# Patient Record
Sex: MALE | Race: WHITE | URBAN - METROPOLITAN AREA
[De-identification: names, ages, dates, MRNs, and addresses within clinical notes are randomized per-mention and may not be internally consistent; named-entity substitution may affect disease eponyms.]

---

## 2017-02-10 ENCOUNTER — OFFICE VISIT (OUTPATIENT)
Dept: PODIATRY | Facility: CLINIC | Age: 77
End: 2017-02-10
Payer: MEDICARE

## 2017-02-10 VITALS — HEIGHT: 67 IN | HEART RATE: 72 BPM | BODY MASS INDEX: 27 KG/M2 | WEIGHT: 172 LBS

## 2017-02-10 DIAGNOSIS — M77.8 CAPSULITIS OF FOOT, LEFT: Primary | ICD-10-CM

## 2017-02-10 DIAGNOSIS — L84 CORNS AND CALLOSITIES: ICD-10-CM

## 2017-02-10 PROCEDURE — 99213 OFFICE O/P EST LOW 20 MIN: CPT | Mod: 25 | Performed by: PODIATRIST

## 2017-02-10 PROCEDURE — 11056 PARNG/CUTG B9 HYPRKR LES 2-4: CPT | Mod: GA | Performed by: PODIATRIST

## 2017-02-10 NOTE — PATIENT INSTRUCTIONS
We wish you continued good healing. If you have any questions or concerns, please do not hesitate to contact us at [phone].      Please remember to call and schedule a follow up appointment if one was recommended at your earliest convenience.   PODIATRY CLINIC HOURS  TELEPHONE NUMBER    Dr. Gustavo Martínez D.P.M [facility]    Clinics:  [facility]        Mireya Martin MA  Medical Assistant  Tuesday 1PM-6PM  [place]  Wednesday 7AM-2PM  [place]  Thursday 10AM-6PM  [place] Friday 7AM-345PM  [place]  Specialty schedulers:   [phone] to make an appointment with any Specialty Provider.        Urgent Care locations:    [facility] Monday-Friday 5 pm - 9 pm. Saturday-Sunday 9 am -5pm    Monday-Friday 11 am - 9 pm Saturday 9 am - 5 pm     Monday-Sunday 12 noon-8PM [phone]     If you need a medication refill, please contact us you may need lab work and/or a follow up visit prior to your refill (i.e. Antifungal medications).    Layar[employer] (secure e-mail communication and access to your chart) to send a message or to make an appointment.    If MRI needed please call [facility] at [phone]        Weight management plan: Patient was referred to their PCP to discuss a diet and exercise plan.     Smoking Cessation    What's in cigarette smoke? - Cigarette smoke contains over 4,000 chemicals. Nicotine is one of the main ingredients which is an insecticide/herbicide. It is poisonous to our nervous system, increases blood clotting risk, and decreases the body's defenses to fight off infection. Another chemical is Carbon Monoxide is an asphyxiating gas that permanently binds to blood cells and blocks the transport of oxygen. This leads to tissue death and decreases your metabolism. Tar is a chemical that coats your lungs and trachea which impairs new oxygen coming in  and carbon dioxide getting out of your body.   How does smoking impact surgery? - Smoking is particularly hazardous with regards to surgery. Surgery puts stress on the body and a smoker's body is already under strain from these chemicals. Putting the two together, especially for an elective surgery, could be a recipe for disaster. Smoking before and after surgery increases your risk of heart problems, slow wound healing, delayed bone healing, blood clots, wound infection and anesthesia complications.   What are the benefits of quitting? - In 20 minutes your blood pressure will drop back down to normal. In 8 hours the carbon monoxide (a toxic gas) levels in your blood stream will drop by half, and oxygen levels will return to normal. In 48 hours your chance of having a heart attack will have decreased. All nicotine will have left your body. Your sense of taste and smell will return to a normal level. In 72 hours your bronchial tubes will relax, and your energy levels will increase. In 2 weeks your circulation will increase, and it will continue to improve for the next 10 weeks.    Recommendations for elective surgery - Ideally, patients should quit smoking 8 weeks before and at least 2 weeks after elective surgery in order to avoid complications. Simply cutting back on the amount of cigarettes smoked per day does not offer any benefit or decrease the risk of poor wound healing, heart problems, and infection. Smokers should also start taking Vitamin C and B for two weeks before surgery and two weeks after surgery.    Ways to Stop Smokin. Nicotine patches, lozenges, or gum  2. Support Groups  3. Medications (see below)    List of Medications:  1. Varenicline Tartrate (CHANTIX)   2. Bupropion HCL (WELLBUTRIN, ZYBAN)   note: make sure Wellbutrin is for smoking cessation and not other issues   3. Nicotine Patch (NICODERM)   4. Nicotine Inhaler (NICOTROL)   5. Nicotine Gum Nicotine Polacrilex   6. Nicotine Lozenge:  Nicotine Polacrilex (COMMIT)   * [place] [facility] offers a smoking support group as well!  Please visit: [email or web address]  If you are interested in these, ask about getting a prescription or talk to your primary care doctor about what may be the best way for you to quit.

## 2017-02-10 NOTE — NURSING NOTE
"Chief Complaint   Patient presents with     Foot Problems     trim callus       Initial Pulse 72  Ht 1.702 m (5' 7\")  Wt 78.019 kg (172 lb)  BMI 26.93 kg/m2 Estimated body mass index is 26.93 kg/(m^2) as calculated from the following:    Height as of this encounter: 1.702 m (5' 7\").    Weight as of this encounter: 78.019 kg (172 lb).  Medication Reconciliation: complete  "

## 2017-02-10 NOTE — PROGRESS NOTES
S:  Patient complains of left foot pain.  Points to plantar second metatarsal head.  Describes as a burning pain.  aggravated by activity and relieved by rest.  Has had this for 1 months.  No pain anywhere else on feet.  Current shoes are old.  Would also like calluses trimmed and signed ABN before seeing him.  Patient  has PAD.      ROS:  Denies edema, erythema, ecchymosis, numbness, or drainage.  Denies weakness or toe deformity.       Allergies   Allergen Reactions     Ace Inhibitors      COUGH     Bacitracin      Ceftin      Crestor [Hmg-Coa-R Inhibitors]      Septra [Bactrim]        Current Outpatient Prescriptions   Medication Sig Dispense Refill     predniSONE (DELTASONE) 2.5 MG tablet Take 2 tablets (5 mg) by mouth daily 180 tablet 0     hydrochlorothiazide (HYDRODIURIL) 25 MG tablet Take 1 tablet (25 mg) by mouth daily 90 tablet 2     potassium chloride SA (POTASSIUM CHLORIDE) 20 MEQ tablet Take 1 tablet (20 mEq) by mouth daily 90 tablet 1     amLODIPine (NORVASC) 10 MG tablet Take 1 tablet (10 mg) by mouth daily 90 tablet 1     atenolol (TENORMIN) 25 MG tablet 3 po daily 270 tablet 2     simvastatin (ZOCOR) 40 MG tablet Take 1 tablet (40 mg) by mouth At Bedtime 90 tablet 3     Cholecalciferol (VITAMIN D) 1000 UNITS capsule Take 2 capsules by mouth daily.       aspirin 81 MG tablet Take 1 tablet by mouth daily.         Patient Active Problem List   Diagnosis     Leukocytoclastic vasculitis (H)     Peripheral vascular disease (H)     HYPERLIPIDEMIA LDL GOAL <100     Tobacco abuse     Palindromic rheumatism     Advanced directives, counseling/discussion     Hypertension goal BP (blood pressure) < 140/90     Cataract     Carotid stenosis     Neurogenic bladder     Hypertension     Peripheral arterial occlusive disease (H)       Past Medical History   Diagnosis Date     Carotid stenosis      left     Urinary retention      self cath     Peripheral vascular disease (H)      VENTRAL HERNIA      Hyperlipemia       "History of depression      Tobacco dependence      Hemorrhoids      Condyloma acuminata      Dermatitis      metal-contact     Malignant melanoma nos 2-07     HTN        Past Surgical History   Procedure Laterality Date     Hc exc malig skin lesion trunk/arm/leg 1.1-2.0 cm  2/07     right forearm melanoma     Pvd stenting  summer 2010     4 stents total, 2 different procedures     Kidney surgery       r renal artery stent       Family History   Problem Relation Age of Onset     Cardiovascular Father      Cardiovascular Maternal Uncle      DIABETES Mother      Breast Cancer Mother      Hypertension Mother      Hypertension Sister      Unknown/Adopted Daughter      Glaucoma No family hx of      Macular Degeneration No family hx of        Social History   Substance Use Topics     Smoking status: Current Every Day Smoker -- 1.00 packs/day for 50 years     Types: Cigarettes     Smokeless tobacco: Never Used     Alcohol Use: No         O:   Pulse 72  Ht 1.702 m (5' 7\")  Wt 78.019 kg (172 lb)  BMI 26.93 kg/m2.  Patient good historian.  A&O X3.  Pulses DP, PT 0/4 b/l.  CRT < 3 seconds X 10 digits.  ankle edema or varicosities noted.  Sensation to light touch intact b/l.  Reflexes 2/4 b/l.  Skin thin scant hairb/l.  Sub left 1/2/5 calluses, but 2 very slight.  Normal arch with weightbearing.  HT deformities noted.  MS 5/5 all compartments.  Normal ROM all fore foot and rearfoot joints.  No equinus.  Pain under left second metatarsal head plantar.  Negative Lachmans test.  Negative Mulders click.  No pain anywhere else.  No erythema, edema, ecchymosis, or subcutaneous masses noted.  Fat pad very atrophic.      A:  Subsecond capsulitis left foot        Calluses x 3    P:  Discussed cause with patient.  Ice bid.  Instructed to wear stiff soles shoes at all times and I made suggestions.  Dispensed budin splint.    Avoid activities that bother this and discussed which activities will aggravate.  ABN signed and calluses " debrided.   RETURN TO CLINIC PRN.    Gustavo Martínez DPM, FACFAS

## 2017-02-10 NOTE — MR AVS SNAPSHOT
After Visit Summary   2/10/2017    Mendoza Shaikh    MRN: [id number]           Patient Information     Date Of Birth          1940        Visit Information        Provider Department      2/10/2017 12:30 PM Gustavo Martínez, [facility]        Care Instructions    We wish you continued good healing. If you have any questions or concerns, please do not hesitate to contact us at [phone].      Please remember to call and schedule a follow up appointment if one was recommended at your earliest convenience.   PODIATRY CLINIC HOURS  TELEPHONE NUMBER    Dr. Gustavo Martínez DLeviPGARLAND [facility]    Clinics:  [facility]        Mireya Martin MA  Medical Assistant  Tuesday 1PM-6PM  [place]  Wednesday 7AM-2PM  [place]  Thursday 10AM-6PM  [place]  Friday 7AM-345PM  [place]  Specialty schedulers:   [phone] to make an appointment with any Specialty Provider.        Urgent Care locations:    [facility] Monday-Friday 5 pm - 9 pm. Saturday-Sunday 9 am -5pm    Monday-Friday 11 am - 9 pm Saturday 9 am - 5 pm     Monday-Sunday 12 noon-8PM [phone]     If you need a medication refill, please contact us you may need lab work and/or a follow up visit prior to your refill (i.e. Antifungal medications).    Handyhart (secure e-mail communication and access to your chart) to send a message or to make an appointment.    If MRI needed please call [facility] at [phone]        Weight management plan: Patient was referred to their PCP to discuss a diet and exercise plan.     Smoking Cessation    What's in cigarette smoke? - Cigarette smoke contains over 4,000 chemicals. Nicotine is one of the main ingredients which is an insecticide/herbicide. It is poisonous to our nervous system, increases blood clotting risk, and decreases the  body's defenses to fight off infection. Another chemical is Carbon Monoxide is an asphyxiating gas that permanently binds to blood cells and blocks the transport of oxygen. This leads to tissue death and decreases your metabolism. Tar is a chemical that coats your lungs and trachea which impairs new oxygen coming in and carbon dioxide getting out of your body.   How does smoking impact surgery? - Smoking is particularly hazardous with regards to surgery. Surgery puts stress on the body and a smoker's body is already under strain from these chemicals. Putting the two together, especially for an elective surgery, could be a recipe for disaster. Smoking before and after surgery increases your risk of heart problems, slow wound healing, delayed bone healing, blood clots, wound infection and anesthesia complications.   What are the benefits of quitting? - In 20 minutes your blood pressure will drop back down to normal. In 8 hours the carbon monoxide (a toxic gas) levels in your blood stream will drop by half, and oxygen levels will return to normal. In 48 hours your chance of having a heart attack will have decreased. All nicotine will have left your body. Your sense of taste and smell will return to a normal level. In 72 hours your bronchial tubes will relax, and your energy levels will increase. In 2 weeks your circulation will increase, and it will continue to improve for the next 10 weeks.    Recommendations for elective surgery - Ideally, patients should quit smoking 8 weeks before and at least 2 weeks after elective surgery in order to avoid complications. Simply cutting back on the amount of cigarettes smoked per day does not offer any benefit or decrease the risk of poor wound healing, heart problems, and infection. Smokers should also start taking Vitamin C and B for two weeks before surgery and two weeks after surgery.    Ways to Stop Smokin. Nicotine patches, lozenges, or gum  2. Support Groups  3.  "Medications (see below)    List of Medications:  1. Varenicline Tartrate (CHANTIX)   2. Bupropion HCL (WELLBUTRIN, ZYBAN) - note: make sure Wellbutrin is for smoking cessation and not other issues   3. Nicotine Patch (NICODERM)   4. Nicotine Inhaler (NICOTROL)   5. Nicotine Gum Nicotine Polacrilex   6. Nicotine Lozenge: Nicotine Polacrilex (COMMIT)   * [place] offers a smoking support group as well!  Please visit: [email or web address]  If you are interested in these, ask about getting a prescription or talk to your primary care doctor about what may be the best way for you to quit.                 Follow-ups after your visit        Your next 10 appointments already scheduled     Feb 10, 2017 12:30 PM   Return Visit with Gustavo Martínez DPM   [facility]    [address] [place] [id number] [phone]              Who to contact     If you have questions or need follow up information about today's clinic visit or your schedule please contact [facility] directly at [phone].  Normal or non-critical lab and imaging results will be communicated to you by MyChart, letter or phone within 4 business days after the clinic has received the results. If you do not hear from us within 7 days, please contact the clinic through MyChart or phone. If you have a critical or abnormal lab result, we will notify you by phone as soon as possible.  Submit refill requests through Playspace or call your pharmacy and they will forward the refill request to us. Please allow 3 business days for your refill to be completed.          Additional Information About Your Visit        [employer] Information     Playspace lets you send messages to your doctor, view your test results, renew your prescriptions, schedule appointments and more. To sign up, go to [email or web address] . Click on \"Log in\" on the left side of " "the screen, which will take you to the Welcome page. Then click on \"Sign up Now\" on the right side of the page.     You will be asked to enter the access code listed below, as well as some personal information. Please follow the directions to create your username and password.     Your access code is: [email or web address]  Expires: 3/29/2017  3:42 PM     Your access code will  in 90 days. If you need help or a new code, please call your [place] clinic or [phone].        Care EveryWhere ID     This is your Care EveryWhere ID. This could be used by other organizations to access your [place] medical records  [id number]        Your Vitals Were     Pulse Height BMI (Body Mass Index)             72 1.702 m (5' 7\") 26.93 kg/m2          Blood Pressure from Last 3 Encounters:   16 168/74   16 136/70   16 150/68    Weight from Last 3 Encounters:   02/10/17 78.019 kg (172 lb)   16 78.019 kg (172 lb)   16 79.379 kg (175 lb)              Today, you had the following     No orders found for display       Primary Care Provider Office Phone # Fax #    Mitch Herrera [phone]       [address] [address]        Thank you!     Thank you for choosing [facility]  for your care. Our goal is always to provide you with excellent care. Hearing back from our patients is one way we can continue to improve our services. Please take a few minutes to complete the written survey that you may receive in the mail after your visit with us. Thank you!             Your Updated Medication List - Protect others around you: Learn how to safely use, store and throw away your medicines at www.disposemymeds.org.          This list is accurate as of: 2/10/17 12:22 PM.  Always use your most recent med list.                   Brand Name Dispense Instructions for use    amLODIPine 10 MG tablet    NORVASC    90 tablet    Take 1 " tablet (10 mg) by mouth daily       aspirin 81 MG tablet      Take 1 tablet by mouth daily.       atenolol 25 MG tablet    TENORMIN    270 tablet    3 po daily       hydrochlorothiazide 25 MG tablet    HYDRODIURIL    90 tablet    Take 1 tablet (25 mg) by mouth daily       potassium chloride SA 20 MEQ CR tablet    potassium chloride    90 tablet    Take 1 tablet (20 mEq) by mouth daily       predniSONE 2.5 MG tablet    DELTASONE    180 tablet    Take 2 tablets (5 mg) by mouth daily       simvastatin 40 MG tablet    ZOCOR    90 tablet    Take 1 tablet (40 mg) by mouth At Bedtime       vitamin D 1000 UNITS capsule      Take 2 capsules by mouth daily.

## 2017-02-22 ENCOUNTER — TELEPHONE (OUTPATIENT)
Dept: FAMILY MEDICINE | Facility: CLINIC | Age: 77
End: 2017-02-22

## 2017-02-22 NOTE — TELEPHONE ENCOUNTER
Forms received from: [employer]   Phone number listed: [phone]   Fax listed: [phone]  Date received: 2-22-17  Form description: bladder supplies  Once forms are completed, please return to [place] via fax.  Is patient requesting to be contacted when forms are completed: n/a  Form placed: provider desk  Yanira Barragan

## 2017-02-27 DIAGNOSIS — M31.0 LEUKOCYTOCLASTIC VASCULITIS (H): ICD-10-CM

## 2017-02-27 RX ORDER — PREDNISONE 2.5 MG/1
5 TABLET ORAL DAILY
Qty: 180 TABLET | Refills: 0 | Status: SHIPPED | OUTPATIENT
Start: 2017-02-27 | End: 2017-04-14

## 2017-02-27 NOTE — TELEPHONE ENCOUNTER
Routing refill request to provider for review/approval because:  Drug not on the [facility] refill protocol       Fany Nguyễn RN  [facility]

## 2017-02-27 NOTE — TELEPHONE ENCOUNTER
predniSONE (DELTASONE) 2.5 MG tablet      Last Written Prescription Date:  12-5-16  Last Fill Quantity: 180,   # refills: 0  Last Office Visit with [facility] or [facility] prescribing provider: 8-16-16  Future Office visit:       Routing refill request to provider for review/approval because:  Drug not on the [facility], [facility] or [facility] refill protocol or controlled substance

## 2017-03-21 ENCOUNTER — OFFICE VISIT (OUTPATIENT)
Dept: FAMILY MEDICINE | Facility: CLINIC | Age: 77
End: 2017-03-21
Payer: MEDICARE

## 2017-03-21 VITALS — WEIGHT: 172 LBS | OXYGEN SATURATION: 98 % | HEART RATE: 50 BPM | BODY MASS INDEX: 26.94 KG/M2 | TEMPERATURE: 98 F

## 2017-03-21 DIAGNOSIS — M31.0 LEUKOCYTOCLASTIC VASCULITIS (H): ICD-10-CM

## 2017-03-21 DIAGNOSIS — L82.1 SEBORRHEIC KERATOSES: Primary | ICD-10-CM

## 2017-03-21 DIAGNOSIS — Z23 NEED FOR TDAP VACCINATION: ICD-10-CM

## 2017-03-21 DIAGNOSIS — I83.93 VARICOSE VEINS OF BOTH LOWER EXTREMITIES: ICD-10-CM

## 2017-03-21 DIAGNOSIS — R73.01 IMPAIRED FASTING GLUCOSE: ICD-10-CM

## 2017-03-21 DIAGNOSIS — I73.9 PERIPHERAL VASCULAR DISEASE (H): ICD-10-CM

## 2017-03-21 DIAGNOSIS — M12.30 PALINDROMIC RHEUMATISM: ICD-10-CM

## 2017-03-21 DIAGNOSIS — Z72.0 TOBACCO ABUSE: ICD-10-CM

## 2017-03-21 PROCEDURE — 99214 OFFICE O/P EST MOD 30 MIN: CPT | Performed by: FAMILY MEDICINE

## 2017-03-21 ASSESSMENT — PAIN SCALES - GENERAL: PAINLEVEL: NO PAIN (0)

## 2017-03-21 NOTE — NURSING NOTE
"Chief Complaint   Patient presents with     Hypertension     Health Maintenance       Initial Pulse 50  Temp 98  F (36.7  C) (Oral)  Wt 172 lb (78 kg)  SpO2 98%  BMI 26.94 kg/m2 Estimated body mass index is 26.94 kg/(m^2) as calculated from the following:    Height as of 2/10/17: 5' 7\" (1.702 m).    Weight as of this encounter: 172 lb (78 kg).  Medication Reconciliation: complete   Sahra Ayala CMA      "

## 2017-03-21 NOTE — PATIENT INSTRUCTIONS
Stay on same dose prednisone for now.  If rash worsens again, call.    In 1-2 months, decrease to 1 prednisone pill daily. ( return to 2 pills daily if rash worsens significantly )    See us mid summer    Okay to monitor skin lesions on face    Advise smoking cessation

## 2017-03-21 NOTE — PROGRESS NOTES
SUBJECTIVE:                                                    Mendoza Shaikh is a 77 year old male who presents to clinic today for the following health issues:       Hypertension Follow-up      Outpatient blood pressures are not being checked.    Low Salt Diet: no added salt       Amount of exercise or physical activity: None    Problems taking medications regularly: No    Medication side effects: none    Diet: low salt and low fat/cholesterol      None    Wondering about skin lesion near right eye     The general rash has got worse on the legs        Problem list and histories reviewed & adjusted, as indicated.  Additional history: as documented         Reviewed and updated as needed this visit by clinical staff  Tobacco  Allergies  Meds  Problems  Med Hx  Surg Hx  Fam Hx  Soc Hx        Reviewed and updated as needed this visit by Provider            Exam;  Full physical not done     Mentation and affect are fine    No tremor of speech or extremity    Patient does have scattered red lesions on legs.  None more than nickel sized and many only a few mm wide.  Nontender.    No generalized cellulitis or edema.    On face he has multiple seborrheic keratoses.  On nonpigmented one is just above medial right eye.    Sclera/ conj clear.    He still has the prominent blood vessels/ telangectasias on cheeks.    Large ropy varicose veins on legs but nontender    ASSESSMENT / PLAN:  (L82.1) Seborrheic keratoses  (primary encounter diagnosis)  Comment: not worrisome   Plan: follow up prn     (M31.0) Leukocytoclastic vasculitis (H)  Comment: in general this has been very well controlled on the low dose prednisone  Plan: had a bit of a flare but now better he states. The condition is usually better in the summer.  Plan to cut down to just one pill daily in the summer.      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 Had tried to order via pharmacy.   Plan: DISCONTINUED: Tdap, tetanus-diphtheria-acell         pertussis, (ADACEL) 5-2-15.5 LF-MCG/0.5         injection, CANCELED: VACCINE ADMINISTRATION,         INITIAL             (I83.93) Varicose veins of both lower extremities  Comment: no symptoms so just monitor   Plan: as above     (R73.01) Impaired fasting glucose  Comment: plan to recheck this and other labs in the summer   Plan: return to clinic at that time, sooner if needed       I reviewed the patient's medications, allergies, medical history, family history, and social history.    Mitch Herrera MD

## 2017-03-21 NOTE — MR AVS SNAPSHOT
"              After Visit Summary   3/21/2017    Mendoza Shaikh    MRN: [id number]           Patient Information     Date Of Birth          1940        Visit Information        Provider Department      3/21/2017 10:40 AM Mitch Herrera MD [facility]        Today's Diagnoses     Seborrheic keratoses    -  1    Leukocytoclastic vasculitis (H)        Palindromic rheumatism        Tobacco abuse        Need for Tdap vaccination          Care Instructions    Stay on same dose prednisone for now.  If rash worsens again, call.    In 1-2 months, decrease to 1 prednisone pill daily. ( return to 2 pills daily if rash worsens significantly )    See us mid summer    Okay to monitor skin lesions on face    Advise smoking cessation             Follow-ups after your visit        Who to contact     If you have questions or need follow up information about today's clinic visit or your schedule please contact [facility] directly at [phone].  Normal or non-critical lab and imaging results will be communicated to you by MyChart, letter or phone within 4 business days after the clinic has received the results. If you do not hear from us within 7 days, please contact the clinic through MyChart or phone. If you have a critical or abnormal lab result, we will notify you by phone as soon as possible.  Submit refill requests through [employer] or call your pharmacy and they will forward the refill request to us. Please allow 3 business days for your refill to be completed.          Additional Information About Your Visit        MyChart Information     [employer] lets you send messages to your doctor, view your test results, renew your prescriptions, schedule appointments and more. To sign up, go to [email or web address] . Click on \"Log in\" on the left side of the screen, which will take you to the Welcome page. Then click on \"Sign up Now\" on the right side of the page.     You will " be asked to enter the access code listed below, as well as some personal information. Please follow the directions to create your username and password.     Your access code is: [email or web address]  Expires: 3/29/2017  4:42 PM     Your access code will  in 90 days. If you need help or a new code, please call your [place] clinic or [phone].        Care EveryWhere ID     This is your Care EveryWhere ID. This could be used by other organizations to access your [place] medical records  [id number]        Your Vitals Were     Pulse Temperature Pulse Oximetry BMI (Body Mass Index)          50 98  F (36.7  C) (Oral) 98% 26.94 kg/m2         Blood Pressure from Last 3 Encounters:   16 168/74   16 136/70   16 150/68    Weight from Last 3 Encounters:   17 172 lb (78 kg)   02/10/17 172 lb (78 kg)   16 172 lb (78 kg)              We Performed the Following     VACCINE ADMINISTRATION, INITIAL          Today's Medication Changes          These changes are accurate as of: 3/21/17 11:37 AM.  If you have any questions, ask your nurse or doctor.               Start taking these medicines.        Dose/Directions    Tdap (tetanus-diphtheria-acell pertussis) 5-2-15.5 LF-MCG/0.5 injection   Commonly known as:  ADACEL   Used for:  Need for Tdap vaccination   Started by:  Mitch Herrera MD        Dose:  0.5 mL   Inject 0.5 mLs into the muscle once for 1 dose   Quantity:  0.5 mL   Refills:  0            Where to get your medicines      Some of these will need a paper prescription and others can be bought over the counter.  Ask your nurse if you have questions.     Bring a paper prescription for each of these medications     Tdap (tetanus-diphtheria-acell pertussis) 5-2-15.5 LF-MCG/0.5 injection                Primary Care Provider Office Phone # Fax #    Mitch Herrera [phone]       [facility] [address] [facility] [place] [address]        Thank you!      Thank you for choosing [facility]  for your care. Our goal is always to provide you with excellent care. Hearing back from our patients is one way we can continue to improve our services. Please take a few minutes to complete the written survey that you may receive in the mail after your visit with us. Thank you!             Your Updated Medication List - Protect others around you: Learn how to safely use, store and throw away your medicines at www.disposemymeds.org.          This list is accurate as of: 3/21/17 11:37 AM.  Always use your most recent med list.                   Brand Name Dispense Instructions for use    amLODIPine 10 MG tablet    NORVASC    90 tablet    Take 1 tablet (10 mg) by mouth daily       aspirin 81 MG tablet      Take 1 tablet by mouth daily.       atenolol 25 MG tablet    TENORMIN    270 tablet    3 po daily       hydrochlorothiazide 25 MG tablet    HYDRODIURIL    90 tablet    Take 1 tablet (25 mg) by mouth daily       potassium chloride SA 20 MEQ CR tablet    potassium chloride    90 tablet    Take 1 tablet (20 mEq) by mouth daily       predniSONE 2.5 MG tablet    DELTASONE    180 tablet    Take 2 tablets (5 mg) by mouth daily       simvastatin 40 MG tablet    ZOCOR    90 tablet    Take 1 tablet (40 mg) by mouth At Bedtime       Tdap (tetanus-diphtheria-acell pertussis) 5-2-15.5 LF-MCG/0.5 injection    ADACEL    0.5 mL    Inject 0.5 mLs into the muscle once for 1 dose       vitamin D 1000 UNITS capsule      Take 2 capsules by mouth daily.

## 2017-03-27 ENCOUNTER — TELEPHONE (OUTPATIENT)
Dept: FAMILY MEDICINE | Facility: CLINIC | Age: 77
End: 2017-03-27

## 2017-03-27 NOTE — TELEPHONE ENCOUNTER
Called patient at [phone] (home) to notify of message below from provider. Unable to reach, left a VM to call back to RN triage line.    Delores Blanco RN  [facility]

## 2017-03-27 NOTE — TELEPHONE ENCOUNTER
Increase to 3 pills  ( 7.5 mg total ) daily for 2 weeks.  If better at that point, go back to usual 5 mg dose.  If not better then return to clinic.    Please inform patient.    Mitch Herrera MD

## 2017-03-27 NOTE — TELEPHONE ENCOUNTER
Reason for call:  Patient reporting a symptom    Symptom or request: rash, not going away, getting worse    Have you been treated for this before? Yes    Additional comments: Seen 3-21-17.  Patient wondering if he should increase his Prednisone for this?    Phone Number patient can be reached at:  Home number on file [phone] (home)    Best Time:  any    Can we leave a detailed message on this number:  YES    Call taken on 3/27/2017 at 9:36 AM by Yanira Barragan

## 2017-03-27 NOTE — TELEPHONE ENCOUNTER
Stay on same dose prednisone for now. If rash worsens again, call.   In 1-2 months, decrease to 1 prednisone pill daily. ( return to 2 pills daily if rash worsens significantly )    Routed to provider to advise if patient should increase his prednisone.  Delores Blanco RN  [facility]

## 2017-03-27 NOTE — TELEPHONE ENCOUNTER
Called patient at [phone] (home) and notified him of message below from provider. Patient stated understanding.    Delores Blanco RN  [facility]

## 2017-04-07 ENCOUNTER — TELEPHONE (OUTPATIENT)
Dept: FAMILY MEDICINE | Facility: CLINIC | Age: 77
End: 2017-04-07

## 2017-04-07 NOTE — TELEPHONE ENCOUNTER
Called patient and notified him of message below from covering provider. Patient stated understanding.    Delores Blanco RN  [facility]

## 2017-04-07 NOTE — TELEPHONE ENCOUNTER
Patient returned call to RN line and left message for call back to him.  Krysta Guevara RN  [facility]

## 2017-04-07 NOTE — TELEPHONE ENCOUNTER
From OV 3/21/17:   Stay on same dose prednisone for now. If rash worsens again, call.   In 1-2 months, decrease to 1 prednisone pill daily. ( return to 2 pills daily if rash worsens significantly )    From 3/27/17:  Increase to 3 pills ( 7.5 mg total ) daily for 2 weeks. If better at that point, go back to usual 5 mg dose. If not better then return to clinic.    Patient is still taking 7.5 mg daily. Rn scheduled him to come back in for a FU on 4/14/17. In the meantime is it ok for patient to continue on with 7.5 mg daily?     Routed to provider to advise.  Delores Blanco RN  [facility]

## 2017-04-07 NOTE — TELEPHONE ENCOUNTER
Called patient at [phone] (home) to notify him of message below. Unable to reach, left a message for patient to call back to Rn triage line.    Delores Blanco RN  [facility]

## 2017-04-07 NOTE — TELEPHONE ENCOUNTER
Reason for Call:  Other     Detailed comments: Patient stated that he had been given prednisone for rash on legs and ankles he has completed the medication and still continues with the symptoms. Please contact patient to discuss further treatment options.     Phone Number Patient can be reached at: Other phone number:  *    Best Time:     Can we leave a detailed message on this number? Not Applicable    Call taken on 4/7/2017 at 10:09 AM by Yumiko De Souza

## 2017-04-14 ENCOUNTER — OFFICE VISIT (OUTPATIENT)
Dept: FAMILY MEDICINE | Facility: CLINIC | Age: 77
End: 2017-04-14
Payer: MEDICARE

## 2017-04-14 VITALS
HEART RATE: 64 BPM | WEIGHT: 174 LBS | DIASTOLIC BLOOD PRESSURE: 70 MMHG | BODY MASS INDEX: 27.25 KG/M2 | OXYGEN SATURATION: 99 % | SYSTOLIC BLOOD PRESSURE: 125 MMHG | TEMPERATURE: 97.1 F

## 2017-04-14 DIAGNOSIS — Z72.0 TOBACCO ABUSE: ICD-10-CM

## 2017-04-14 DIAGNOSIS — M54.50 LOW BACK PAIN WITHOUT SCIATICA, UNSPECIFIED BACK PAIN LATERALITY, UNSPECIFIED CHRONICITY: ICD-10-CM

## 2017-04-14 DIAGNOSIS — M31.0 LEUKOCYTOCLASTIC VASCULITIS (H): Primary | ICD-10-CM

## 2017-04-14 DIAGNOSIS — Z11.9 SCREENING EXAMINATION FOR INFECTIOUS DISEASE: ICD-10-CM

## 2017-04-14 DIAGNOSIS — I10 HYPERTENSION GOAL BP (BLOOD PRESSURE) < 140/90: ICD-10-CM

## 2017-04-14 PROCEDURE — 99214 OFFICE O/P EST MOD 30 MIN: CPT | Performed by: FAMILY MEDICINE

## 2017-04-14 RX ORDER — PREDNISONE 2.5 MG/1
5 TABLET ORAL DAILY
Qty: 180 TABLET | Refills: 0 | Status: SHIPPED | OUTPATIENT
Start: 2017-04-14 | End: 2017-08-09

## 2017-04-14 ASSESSMENT — PAIN SCALES - GENERAL: PAINLEVEL: NO PAIN (0)

## 2017-04-14 NOTE — NURSING NOTE
"Chief Complaint   Patient presents with     Derm Problem     Health Maintenance       Initial /78  Pulse 64  Temp 97.1  F (36.2  C) (Oral)  Wt 174 lb (78.9 kg)  SpO2 99%  BMI 27.25 kg/m2 Estimated body mass index is 27.25 kg/(m^2) as calculated from the following:    Height as of 2/10/17: 5' 7\" (1.702 m).    Weight as of this encounter: 174 lb (78.9 kg).  Medication Reconciliation: complete   Sahra Ayala CMA      "

## 2017-04-14 NOTE — PATIENT INSTRUCTIONS
Continue 3 pills for another week, then if rash improving go to 2 pills daily    Return to clinic if you need to stay on the higher dose     Back strain was likely muscular; stay as active as possible     Try to vary diet more    Smoking cessation

## 2017-04-14 NOTE — PROGRESS NOTES
SUBJECTIVE:                                                    Mendoza Shaikh is a 77 year old male who presents to clinic today for the following health issues:       Follow up on rash    none    Problem list and histories reviewed & adjusted, as indicated.  Additional history: as documented         Reviewed and updated as needed this visit by clinical staff  Tobacco  Allergies  Meds  Problems  Med Hx  Surg Hx  Fam Hx  Soc Hx        Reviewed and updated as needed this visit by Provider    Patient wondering about hep c test    Pain on right side of back, went away      Patient points to the area, but it is nontender     Patient likes to eat meat    Has [facility] chicken strips 2x daily      Has good supper with veggies, meat , etc.  That varies a lot.    Always smokes outside    Rash was getting worse but now better today     On the 3 prednisone pills daily for 3 weeks ( total of 7.5 mg ) now. 2 pills / 5 mg before that             Full physical not done     Mentation and affect are fine    No tremor of speech or extremity    The rash on the legs is about the same as last time I looked at it    He points to right lumbar area when asked where the back pain was    No tenderness there now    No radiculopathy    Range of motion of back okay and non painful    Good radial pulses       ASSESSMENT / PLAN:  (M31.0) Leukocytoclastic vasculitis (H)  (primary encounter diagnosis)  Comment: discussed in detail. After another week at 7.5 mg daily patient will go down to 5 mg.    Plan: predniSONE (DELTASONE) 2.5 MG tablet        Return to clinic with problems. Of note, if things really improve could even go down to 2.5 mg daily this summer.     (Z72.0) Tobacco abuse  Comment: chronic  Plan: advised cessation     (I10) Hypertension goal BP (blood pressure) < 140/90  Comment: on recheck blood pressure at goal   Plan: no change     (M54.5) Low back pain without sciatica, unspecified back pain laterality, unspecified  chronicity  Comment: likely muscular   Plan: follow up prn. Advised active stretching/ strengthening program.     Screen inf dis: we checked, and twice we have checked patient for hep C  Neg x 2.       I reviewed the patient's medications, allergies, medical history, family history, and social history.    Mitch Herrera MD

## 2017-04-24 DIAGNOSIS — I10 HTN (HYPERTENSION): ICD-10-CM

## 2017-04-24 RX ORDER — AMLODIPINE BESYLATE 10 MG/1
TABLET ORAL
Qty: 90 TABLET | Refills: 3 | Status: SHIPPED | OUTPATIENT
Start: 2017-04-24 | End: 2018-01-23

## 2017-04-24 NOTE — TELEPHONE ENCOUNTER
amLODIPine (NORVASC) 10 MG tablet      Last Written Prescription Date: 10-21-16  Last Fill Quantity: 90, # refills: 1    Last Office Visit with G, [facility] or [facility] prescribing provider:  4-14-17   Future Office Visit:        BP Readings from Last 3 Encounters:   04/14/17 125/70   12/29/16 168/74   08/16/16 136/70

## 2017-04-25 ENCOUNTER — TELEPHONE (OUTPATIENT)
Dept: FAMILY MEDICINE | Facility: CLINIC | Age: 77
End: 2017-04-25

## 2017-04-25 NOTE — TELEPHONE ENCOUNTER
Called patient and notified him of message below from provider. Unable to reach, left a VM to call back to RN triage line.   Delores Blanco RN  [facility]

## 2017-04-25 NOTE — TELEPHONE ENCOUNTER
Patient returned call to RN line and left message for call back to him at [phone].  Krysta Guevara RN  [facility]

## 2017-04-25 NOTE — TELEPHONE ENCOUNTER
From 4/14/17:   Continue 3 pills for another week, then if rash improving go to 2 pills daily     Return to clinic if you need to stay on the higher dose     Patient states being on the 3 pills has helped the rash but it has not completely gone away and is wondering if he should continue the higher dose for another week.     Routed to provider to advise.  Delores Blanco RN  [facility]

## 2017-04-25 NOTE — TELEPHONE ENCOUNTER
Called patient back and notified him of message below from provider. Patient stated understanding.    Delores Blanco RN  [facility]

## 2017-04-25 NOTE — TELEPHONE ENCOUNTER
Reason for call:  Patient reporting a symptom    Symptom or request: Patient states that he has a rash. Dr Sharon increased his prednisone from 2 pills to 3 pills. It has helped the rash some but it has not totally gone away. Patient is wondering if he should continue the higher dose for another week.     Duration (how long have symptoms been present): Rash started about 3 weeks ago    Have you been treated for this before? Yes    Additional comments: Patient uses [employer]    Phone Number patient can be reached at:  Home number on file [phone] (home)    Best Time:  any    Can we leave a detailed message on this number:  YES    Call taken on 4/25/2017 at 10:07 AM by Zaida Holland

## 2017-04-25 NOTE — TELEPHONE ENCOUNTER
Yes stay on higher dose for one more week, then return to clinic if not improved.    Please inform patient.    Mitch Herrera MD

## 2017-04-28 DIAGNOSIS — E87.6 HYPOKALEMIA: ICD-10-CM

## 2017-04-28 RX ORDER — POTASSIUM CHLORIDE 1500 MG/1
TABLET, EXTENDED RELEASE ORAL
Qty: 90 TABLET | Refills: 1 | Status: SHIPPED | OUTPATIENT
Start: 2017-04-28 | End: 2017-10-31

## 2017-04-28 NOTE — TELEPHONE ENCOUNTER
potassium chloride SA (POTASSIUM CHLORIDE) 20 MEQ tablet      Last Written Prescription Date: 10-27-16  Last Fill Quantity: 90, # refills: 1  Last Office Visit with G, P or [facility] prescribing provider: 4-14-17       Potassium   Date Value Ref Range Status   08/16/2016 3.7 3.4 - 5.3 mmol/L Final     Creatinine   Date Value Ref Range Status   08/16/2016 1.01 0.66 - 1.25 mg/dL Final     BP Readings from Last 3 Encounters:   04/14/17 125/70   12/29/16 168/74   08/16/16 136/70

## 2017-05-26 ENCOUNTER — OFFICE VISIT (OUTPATIENT)
Dept: PODIATRY | Facility: CLINIC | Age: 77
End: 2017-05-26
Payer: MEDICARE

## 2017-05-26 VITALS
WEIGHT: 174 LBS | SYSTOLIC BLOOD PRESSURE: 155 MMHG | DIASTOLIC BLOOD PRESSURE: 80 MMHG | BODY MASS INDEX: 27.25 KG/M2 | HEART RATE: 60 BPM

## 2017-05-26 DIAGNOSIS — L84 CORNS AND CALLOSITIES: Primary | ICD-10-CM

## 2017-05-26 PROCEDURE — 11056 PARNG/CUTG B9 HYPRKR LES 2-4: CPT | Mod: GA | Performed by: PODIATRIST

## 2017-05-26 NOTE — NURSING NOTE
"Chief Complaint   Patient presents with     PERIPHERAL ARTERIAL DISEASE     Foot Problems     callus       Initial /80 (BP Location: Right arm, Patient Position: Chair)  Pulse 60  Wt 78.9 kg (174 lb)  BMI 27.25 kg/m2 Estimated body mass index is 27.25 kg/(m^2) as calculated from the following:    Height as of 2/10/17: 1.702 m (5' 7\").    Weight as of this encounter: 78.9 kg (174 lb).  Medication Reconciliation: complete  "

## 2017-05-26 NOTE — MR AVS SNAPSHOT
After Visit Summary   5/26/2017    Mendoza Shaikh    MRN: [id number]           Patient Information     Date Of Birth          1940        Visit Information        Provider Department      5/26/2017 11:00 AM Gustavo Martínez DPM [facility] Instructions    SMOKING CESSATION    What's in cigarette smoke? - Cigarette smoke contains over 4,000 chemicals. Nicotine is one of the main ingredients which is an insecticide/herbicide. It is poisonous to our nervous system, increases blood clotting risk, and decreases the body's defenses to fight off infection. Another chemical is Carbon Monoxide is an asphyxiating gas that permanently binds to blood cells and blocks the transport of oxygen. This leads to tissue death and decreases your metabolism. Tar is a chemical that coats your lungs and trachea which impairs new oxygen coming in and carbon dioxide getting out of your body.   How does smoking impact surgery? - Smoking is particularly hazardous with regards to surgery. Surgery puts stress on the body and a smoker's body is already under strain from these chemicals. Putting the two together, especially for an elective surgery, could be a recipe for disaster. Smoking before and after surgery increases your risk of heart problems, slow wound healing, delayed bone healing, blood clots, wound infection and anesthesia complications.   What are the benefits of quitting? - In 20 minutes your blood pressure will drop back down to normal. In 8 hours the carbon monoxide (a toxic gas) levels in your blood stream will drop by half, and oxygen levels will return to normal. In 48 hours your chance of having a heart attack will have decreased. All nicotine will have left your body. Your sense of taste and smell will return to a normal level. In 72 hours your bronchial tubes will relax, and your energy levels will increase. In 2 weeks your circulation will increase, and it will  continue to improve for the next 10 weeks.    Recommendations for elective surgery - Ideally, patients should quit smoking 8 weeks before and at least 2 weeks after elective surgery in order to avoid complications. Simply cutting back on the amount of cigarettes smoked per day does not offer any benefit or decrease the risk of poor wound healing, heart problems, and infection. Smokers should also start taking Vitamin C and B for two weeks before surgery and two weeks after surgery.    Ways to Stop Smokin. Nicotine patches, lozenges, or gum  2. Support Groups  3. Medications (see below)    List of Medications:  1. Varenicline Tartrate (CHANTIX)   2. Bupropion HCL (WELLBUTRIN, ZYBAN) - note: make sure Wellbutrin is for smoking cessation and not other issues   3. Nicotine Patch (NICODERM)   4. Nicotine Inhaler (NICOTROL)   5. Nicotine Gum Nicotine Polacrilex   6. Nicotine Lozenge: Nicotine Polacrilex (COMMIT)   * [facility] offers a smoking support group as well!  Please visit: [email or web address]  If you are interested in these, ask about getting a prescription or talk to your primary care doctor about what may be the best way for you to quit.       Weight management plan: Patient was referred to their PCP to discuss a diet and exercise plan.            Follow-ups after your visit        Your next 10 appointments already scheduled     May 26, 2017 11:00 AM CDT   Return Visit with Gustavo Martínez DPM   [facility]    [address] [place] [id number] [phone]              Who to contact     If you have questions or need follow up information about today's clinic visit or your schedule please contact [facility] directly at [phone].  Normal or non-critical lab and imaging results will be communicated to you by MyChart, letter or phone within 4 business days after the clinic has  "received the results. If you do not hear from us within 7 days, please contact the clinic through [employer] or phone. If you have a critical or abnormal lab result, we will notify you by phone as soon as possible.  Submit refill requests through [employer] or call your pharmacy and they will forward the refill request to us. Please allow 3 business days for your refill to be completed.          Additional Information About Your Visit        [employer] Information     Zadego lets you send messages to your doctor, view your test results, renew your prescriptions, schedule appointments and more. To sign up, go to [email or web address] . Click on \"Log in\" on the left side of the screen, which will take you to the Welcome page. Then click on \"Sign up Now\" on the right side of the page.     You will be asked to enter the access code listed below, as well as some personal information. Please follow the directions to create your username and password.     Your access code is: [email or web address]  Expires: 2017  3:01 PM     Your access code will  in 90 days. If you need help or a new code, please call your [place] clinic or [phone].        Care EveryWhere ID     This is your Care EveryWhere ID. This could be used by other organizations to access your [place] medical records  [id number]        Your Vitals Were     Pulse BMI (Body Mass Index)                60 27.25 kg/m2           Blood Pressure from Last 3 Encounters:   17 155/80   17 125/70   16 168/74    Weight from Last 3 Encounters:   17 78.9 kg (174 lb)   17 78.9 kg (174 lb)   17 78 kg (172 lb)              Today, you had the following     No orders found for display       Primary Care Provider Office Phone # Fax #    Mitch Herrera [phone]       [facility] [address] [facility] [address]        Thank you!     Thank you for choosing [facility]  for " your care. Our goal is always to provide you with excellent care. Hearing back from our patients is one way we can continue to improve our services. Please take a few minutes to complete the written survey that you may receive in the mail after your visit with us. Thank you!             Your Updated Medication List - Protect others around you: Learn how to safely use, store and throw away your medicines at www.disposemymeds.org.          This list is accurate as of: 5/26/17 10:53 AM.  Always use your most recent med list.                   Brand Name Dispense Instructions for use    amLODIPine 10 MG tablet    NORVASC    90 tablet    TAKE 1 TABLET (10 MG) BY MOUTH DAILY       aspirin 81 MG tablet      Take 1 tablet by mouth daily.       atenolol 25 MG tablet    TENORMIN    270 tablet    3 po daily       hydrochlorothiazide 25 MG tablet    HYDRODIURIL    90 tablet    Take 1 tablet (25 mg) by mouth daily       potassium chloride SA 20 MEQ CR tablet    K-DUR/KLOR-CON M    90 tablet    TAKE 1 TABLET BY MOUTH DAILY       predniSONE 2.5 MG tablet    DELTASONE    180 tablet    Take 2 tablets (5 mg) by mouth daily       simvastatin 40 MG tablet    ZOCOR    90 tablet    Take 1 tablet (40 mg) by mouth At Bedtime       vitamin D 1000 UNITS capsule      Take 2 capsules by mouth daily.

## 2017-05-26 NOTE — PATIENT INSTRUCTIONS
SMOKING CESSATION    What's in cigarette smoke? - Cigarette smoke contains over 4,000 chemicals. Nicotine is one of the main ingredients which is an insecticide/herbicide. It is poisonous to our nervous system, increases blood clotting risk, and decreases the body's defenses to fight off infection. Another chemical is Carbon Monoxide is an asphyxiating gas that permanently binds to blood cells and blocks the transport of oxygen. This leads to tissue death and decreases your metabolism. Tar is a chemical that coats your lungs and trachea which impairs new oxygen coming in and carbon dioxide getting out of your body.   How does smoking impact surgery? - Smoking is particularly hazardous with regards to surgery. Surgery puts stress on the body and a smoker's body is already under strain from these chemicals. Putting the two together, especially for an elective surgery, could be a recipe for disaster. Smoking before and after surgery increases your risk of heart problems, slow wound healing, delayed bone healing, blood clots, wound infection and anesthesia complications.   What are the benefits of quitting? - In 20 minutes your blood pressure will drop back down to normal. In 8 hours the carbon monoxide (a toxic gas) levels in your blood stream will drop by half, and oxygen levels will return to normal. In 48 hours your chance of having a heart attack will have decreased. All nicotine will have left your body. Your sense of taste and smell will return to a normal level. In 72 hours your bronchial tubes will relax, and your energy levels will increase. In 2 weeks your circulation will increase, and it will continue to improve for the next 10 weeks.    Recommendations for elective surgery - Ideally, patients should quit smoking 8 weeks before and at least 2 weeks after elective surgery in order to avoid complications. Simply cutting back on the amount of cigarettes smoked per day does not offer any benefit or decrease the  risk of poor wound healing, heart problems, and infection. Smokers should also start taking Vitamin C and B for two weeks before surgery and two weeks after surgery.    Ways to Stop Smokin. Nicotine patches, lozenges, or gum  2. Support Groups  3. Medications (see below)    List of Medications:  1. Varenicline Tartrate (CHANTIX)   2. Bupropion HCL (WELLBUTRIN, ZYBAN)   note: make sure Wellbutrin is for smoking cessation and not other issues   3. Nicotine Patch (NICODERM)   4. Nicotine Inhaler (NICOTROL)   5. Nicotine Gum Nicotine Polacrilex   6. Nicotine Lozenge: Nicotine Polacrilex (COMMIT)   * [employer] offers a smoking support group as well!  Please visit: [email or web address]  If you are interested in these, ask about getting a prescription or talk to your primary care doctor about what may be the best way for you to quit.       Weight management plan: Patient was referred to their PCP to discuss a diet and exercise plan.

## 2017-05-26 NOTE — PROGRESS NOTES
S:  Patient here for routine care and signed the ABN before seeing us today.    O:   There is a callus on left sub 1/5 met heads    A:  Callus X 2.    P:  ABN signed.  Calluses debrided.  Dancers pad to offload.  RETURN TO CLINIC prn.      DIALLO CLEMENTS DPM, FACFAS

## 2017-07-16 ENCOUNTER — TELEPHONE (OUTPATIENT)
Dept: FAMILY MEDICINE | Facility: CLINIC | Age: 77
End: 2017-07-16

## 2017-07-16 DIAGNOSIS — I10 HYPERTENSION GOAL BP (BLOOD PRESSURE) < 140/90: ICD-10-CM

## 2017-07-17 RX ORDER — ATENOLOL 25 MG/1
TABLET ORAL
Qty: 270 TABLET | Refills: 0 | Status: SHIPPED | OUTPATIENT
Start: 2017-07-17 | End: 2017-10-31

## 2017-07-17 NOTE — TELEPHONE ENCOUNTER
Routing refill request to provider for review/approval because:  Elevated BP measurements over goal; no plan noted in chart regarding HTN      Fany Nguyễn RN  [facility]

## 2017-07-17 NOTE — TELEPHONE ENCOUNTER
Okayed refill but advise clinic appointment in the next couple months    Please inform patient    Mitch Herrera MD

## 2017-07-17 NOTE — TELEPHONE ENCOUNTER
atenolol (TENORMIN) 25 MG tablet      Last Written Prescription Date: 10/12/16  Last Fill Quantity: 270, # refills: 2    Last Office Visit with [facility], [facility] or [facility] prescribing provider:  4/14/17   Future Office Visit:        BP Readings from Last 3 Encounters:   05/26/17 155/80   04/14/17 125/70   12/29/16 168/74

## 2017-07-18 NOTE — TELEPHONE ENCOUNTER
Message left on home number for patient to call back TC line.  NTBS for BP check with PCP.    Yanira ELLISON

## 2017-07-18 NOTE — TELEPHONE ENCOUNTER
Patient returning call, left message on TC line.  Left detailed message, informing patient of provider note below.

## 2017-07-19 DIAGNOSIS — E78.5 HYPERLIPIDEMIA LDL GOAL <100: ICD-10-CM

## 2017-07-19 RX ORDER — SIMVASTATIN 40 MG
TABLET ORAL
Qty: 90 TABLET | Refills: 0 | Status: SHIPPED | OUTPATIENT
Start: 2017-07-19 | End: 2017-10-15

## 2017-07-19 NOTE — TELEPHONE ENCOUNTER
Routing refill request to provider for review/approval because:  Labs not current  Estee Seo RN [facility] Triage.

## 2017-07-19 NOTE — TELEPHONE ENCOUNTER
simvastatin (ZOCOR) 40 MG tablet     Last Written Prescription Date: 7-18-16  Last Fill Quantity: 90, # refills: 3  Last Office Visit with G, P or [facility] prescribing provider: 4-14-17       Lab Results   Component Value Date    CHOL 182 06/27/2016     Lab Results   Component Value Date    HDL 56 06/27/2016     Lab Results   Component Value Date     06/27/2016     Lab Results   Component Value Date    TRIG 101 06/27/2016     Lab Results   Component Value Date    CHOLHDLRATIO 3.6 06/08/2015

## 2017-07-19 NOTE — TELEPHONE ENCOUNTER
Spoke with patient and informed.  He will call back to schedule an appointment.  Due for fasting labs, too.

## 2017-07-19 NOTE — TELEPHONE ENCOUNTER
Okayed refill but we should see him in the next 1-2 months    Please inform patient    Mitch Herrera MD

## 2017-08-10 DIAGNOSIS — M31.0 LEUKOCYTOCLASTIC VASCULITIS (H): ICD-10-CM

## 2017-08-11 RX ORDER — PREDNISONE 2.5 MG/1
TABLET ORAL
Qty: 180 TABLET | Refills: 0 | OUTPATIENT
Start: 2017-08-11

## 2017-08-11 NOTE — TELEPHONE ENCOUNTER
3 mos supply of prednisone was sent to requesting pharmacy 8/9/17; refusal sent back to pharmacy with note of clarification.    Krysta Guevara RN  [facility]

## 2017-10-05 ENCOUNTER — OFFICE VISIT (OUTPATIENT)
Dept: PODIATRY | Facility: CLINIC | Age: 77
End: 2017-10-05
Payer: MEDICARE

## 2017-10-05 VITALS — BODY MASS INDEX: 25.53 KG/M2 | OXYGEN SATURATION: 99 % | HEART RATE: 64 BPM | WEIGHT: 163 LBS

## 2017-10-05 DIAGNOSIS — L84 CORNS AND CALLOSITIES: ICD-10-CM

## 2017-10-05 DIAGNOSIS — B35.1 DERMATOPHYTOSIS OF NAIL: Primary | ICD-10-CM

## 2017-10-05 DIAGNOSIS — M79.674 PAIN OF TOE OF RIGHT FOOT: ICD-10-CM

## 2017-10-05 PROCEDURE — 11720 DEBRIDE NAIL 1-5: CPT | Mod: 59 | Performed by: PODIATRIST

## 2017-10-05 PROCEDURE — 11056 PARNG/CUTG B9 HYPRKR LES 2-4: CPT | Mod: GA | Performed by: PODIATRIST

## 2017-10-05 NOTE — MR AVS SNAPSHOT
After Visit Summary   10/5/2017    Mendoza Shaikh    MRN: [id number]           Patient Information     Date Of Birth          1940        Visit Information        Provider Department      10/5/2017 1:00 PM Gustavo Martínez, [facility]        Today's Diagnoses     Dermatophytosis of nail    -  1    Pain of toe of right foot        Corns and callosities          Care Instructions    SMOKING CESSATION    What's in cigarette smoke? - Cigarette smoke contains over 4,000 chemicals. Nicotine is one of the main ingredients which is an insecticide/herbicide. It is poisonous to our nervous system, increases blood clotting risk, and decreases the body's defenses to fight off infection. Another chemical is Carbon Monoxide is an asphyxiating gas that permanently binds to blood cells and blocks the transport of oxygen. This leads to tissue death and decreases your metabolism. Tar is a chemical that coats your lungs and trachea which impairs new oxygen coming in and carbon dioxide getting out of your body.   How does smoking impact surgery? - Smoking is particularly hazardous with regards to surgery. Surgery puts stress on the body and a smoker's body is already under strain from these chemicals. Putting the two together, especially for an elective surgery, could be a recipe for disaster. Smoking before and after surgery increases your risk of heart problems, slow wound healing, delayed bone healing, blood clots, wound infection and anesthesia complications.   What are the benefits of quitting? - In 20 minutes your blood pressure will drop back down to normal. In 8 hours the carbon monoxide (a toxic gas) levels in your blood stream will drop by half, and oxygen levels will return to normal. In 48 hours your chance of having a heart attack will have decreased. All nicotine will have left your body. Your sense of taste and smell will return to a normal level. In 72 hours your bronchial  tubes will relax, and your energy levels will increase. In 2 weeks your circulation will increase, and it will continue to improve for the next 10 weeks.    Recommendations for elective surgery - Ideally, patients should quit smoking 8 weeks before and at least 2 weeks after elective surgery in order to avoid complications. Simply cutting back on the amount of cigarettes smoked per day does not offer any benefit or decrease the risk of poor wound healing, heart problems, and infection. Smokers should also start taking Vitamin C and B for two weeks before surgery and two weeks after surgery.    Ways to Stop Smokin. Nicotine patches, lozenges, or gum  2. Support Groups  3. Medications (see below)    List of Medications:  1. Varenicline Tartrate (CHANTIX)   2. Bupropion HCL (WELLBUTRIN, ZYBAN) - note: make sure Wellbutrin is for smoking cessation and not other issues   3. Nicotine Patch (NICODERM)   4. Nicotine Inhaler (NICOTROL)   5. Nicotine Gum Nicotine Polacrilex   6. Nicotine Lozenge: Nicotine Polacrilex (COMMIT)   * [employer] offers a smoking support group as well!  Please visit: [email or web address]  If you are interested in these, ask about getting a prescription or talk to your primary care doctor about what may be the best way for you to quit.       We wish you continued good healing. If you have any questions or concerns, please do not hesitate to contact us at [phone]      Please remember to call and schedule a follow up appointment if one was recommended at your earliest convenience.   PODIATRY CLINIC HOURS  TELEPHONE NUMBER    Dr. Gustavo FLAHERTYPGARLAND [facility]    Clinics:  [facility]  Medical Assistant  Tuesday 1PM-6PM  [place]  Wednesday 7AM-2PM  [place]  Thursday 10AM-6PM  [place] 7AM-345PM  [place]  Specialty schedulers:   [phone] to make an appointment with any  "Specialty Provider.        Urgent Care locations:    [facility] Monday-Friday 5 pm - 9 pm. Saturday-Sunday 9 am -5pm    Monday-Friday 11 am - 9 pm Saturday 9 am - 5 pm     Monday-Sunday 12 noon-8PM [phone]     If you need a medication refill, please contact us you may need lab work and/or a follow up visit prior to your refill (i.e. Antifungal medications).    [employer]arm[employer] (secure e-mail communication and access to your chart) to send a message or to make an appointment.    If MRI needed please call [facility] at [phone]        Weight management plan: Patient was referred to their PCP to discuss a diet and exercise plan.            Follow-ups after your visit        Who to contact     If you have questions or need follow up information about today's clinic visit or your schedule please contact [facility] directly at [phone].  Normal or non-critical lab and imaging results will be communicated to you by PharmAbcinehart, letter or phone within 4 business days after the clinic has received the results. If you do not hear from us within 7 days, please contact the clinic through [employer] or phone. If you have a critical or abnormal lab result, we will notify you by phone as soon as possible.  Submit refill requests through [employer] [employer] or call your pharmacy and they will forward the refill request to us. Please allow 3 business days for your refill to be completed.          Additional Information About Your Visit        [employer] Information     [employer] lets you send messages to your doctor, view your test results, renew your prescriptions, schedule appointments and more. To sign up, go to [email or web address] . Click on \"Log in\" on the left side of the screen, which will take you to the Welcome page. Then click on \"Sign up Now\" on the right side of the page.     You will be asked to enter the access code listed below, as well as " some personal information. Please follow the directions to create your username and password.     Your access code is: [email or web address]  Expires: 1/3/2018  2:23 PM     Your access code will  in 90 days. If you need help or a new code, please call your [facility] clinic or [phone].        Care EveryWhere ID     This is your Care EveryWhere ID. This could be used by other organizations to access your [facility] medical records  [id number]        Your Vitals Were     Pulse Pulse Oximetry BMI (Body Mass Index)             64 99% 25.53 kg/m2          Blood Pressure from Last 3 Encounters:   17 155/80   17 125/70   16 168/74    Weight from Last 3 Encounters:   10/05/17 73.9 kg (163 lb)   17 78.9 kg (174 lb)   17 78.9 kg (174 lb)              We Performed the Following     DEBRIDEMENT OF NAIL(S), 1-5     TRIM HYPERKERATOTIC SKIN LESION,2-4        Primary Care Provider Office Phone # Fax #    Mitch Herrera [phone]       [address] [facility] [address]        Equal Access to Services     JEVON DOE : Hadii shaheen ku hadasho Soomaali, waaxda luqadaha, qaybta kaalmada adeegyada, sigifredo brandt. So [facility] [phone].    ATENCIÓN: Si habla español, tiene a gonsales disposición servicios gratuitos de asistencia lingüística. Llame al [phone].    We comply with applicable federal civil rights laws and [place] laws. We do not discriminate on the basis of race, color, national origin, age, disability, sex, sexual orientation, or gender identity.            Thank you!     Thank you for choosing [facility]  for your care. Our goal is always to provide you with excellent care. Hearing back from our patients is one way we can continue to improve our services. Please take a few minutes to complete the written survey that you may receive in the mail after your visit with us. Thank you!             Your Updated Medication List -  Protect others around you: Learn how to safely use, store and throw away your medicines at www.disposemymeds.org.          This list is accurate as of: 10/5/17  4:50 PM.  Always use your most recent med list.                   Brand Name Dispense Instructions for use Diagnosis    amLODIPine 10 MG tablet    NORVASC    90 tablet    TAKE 1 TABLET (10 MG) BY MOUTH DAILY    HTN (hypertension)       aspirin 81 MG tablet      Take 1 tablet by mouth daily.        atenolol 25 MG tablet    TENORMIN    270 tablet    TAKE THREE TABLETS BY MOUTH ONCE DAILY    Hypertension goal BP (blood pressure) < 140/90       hydrochlorothiazide 25 MG tablet    HYDRODIURIL    90 tablet    TAKE 1 TABLET (25 MG) BY MOUTH DAILY    Hypertension goal BP (blood pressure) < 140/90       potassium chloride SA 20 MEQ CR tablet    K-DUR/KLOR-CON M    90 tablet    TAKE 1 TABLET BY MOUTH DAILY    Hypokalemia       predniSONE 2.5 MG tablet    DELTASONE    180 tablet    TAKE 2 TABS BY MOUTH ONCE DAILY    Leukocytoclastic vasculitis (H)       simvastatin 40 MG tablet    ZOCOR    90 tablet    TAKE 1 TABLET (40 MG) BY MOUTH AT BEDTIME    Hyperlipidemia LDL goal <100       vitamin D 1000 UNITS capsule      Take 2 capsules by mouth daily.

## 2017-10-05 NOTE — LETTER
10/5/2017         RE: Mendoza Shaikh  [address] [address]        Dear Colleague,    Thank you for referring your patient, Mendoza Shaikh, to the [facility]. Please see a copy of my visit note below.    Patient complains of thick nail on right first toe.  Has had this for 2 months .  It is slowly getting worse.  Has had pain in the past which is aggravated by activity and relieved by rest.  Describes as a burning pain.  Hard to walk in shoes now because of the pain. Tried over the counter medications without success.  Does not like the look of the nail and is wondering about options.  Also would like calluses debrided left foot and we had him sign ABN before seeing him today.  He has PAD.      ROS:  No hx of erythema, edema, drainage.         Allergies   Allergen Reactions     Ace Inhibitors      COUGH     Bacitracin      Ceftin      Crestor [Hmg-Coa-R Inhibitors]      Septra [Bactrim]        Current Outpatient Prescriptions   Medication Sig Dispense Refill     predniSONE (DELTASONE) 2.5 MG tablet TAKE 2 TABS BY MOUTH ONCE DAILY 180 tablet 0     hydrochlorothiazide (HYDRODIURIL) 25 MG tablet TAKE 1 TABLET (25 MG) BY MOUTH DAILY 90 tablet 0     simvastatin (ZOCOR) 40 MG tablet TAKE 1 TABLET (40 MG) BY MOUTH AT BEDTIME 90 tablet 0     atenolol (TENORMIN) 25 MG tablet TAKE THREE TABLETS BY MOUTH ONCE DAILY 270 tablet 0     potassium chloride SA (K-DUR/KLOR-CON M) 20 MEQ CR tablet TAKE 1 TABLET BY MOUTH DAILY 90 tablet 1     amLODIPine (NORVASC) 10 MG tablet TAKE 1 TABLET (10 MG) BY MOUTH DAILY 90 tablet 3     Cholecalciferol (VITAMIN D) 1000 UNITS capsule Take 2 capsules by mouth daily.       aspirin 81 MG tablet Take 1 tablet by mouth daily.         Patient Active Problem List   Diagnosis     Leukocytoclastic vasculitis (H)     Peripheral vascular disease (H)     HYPERLIPIDEMIA LDL GOAL <100     Tobacco abuse     Palindromic rheumatism     Advanced  directives, counseling/discussion     Hypertension goal BP (blood pressure) < 140/90     Cataract     Carotid stenosis     Neurogenic bladder     Hypertension     Peripheral arterial occlusive disease (H)     Abnormal cardiovascular stress test       Past Medical History:   Diagnosis Date     Carotid stenosis     left     Condyloma acuminata      Dermatitis     metal-contact     Hemorrhoids      History of depression      HTN      Hyperlipemia      Malignant melanoma nos 2-07     Peripheral vascular disease (H)      Tobacco dependence      Urinary retention     self cath     VENTRAL HERNIA        Past Surgical History:   Procedure Laterality Date     HC EXC MALIG SKIN LESION TRUNK/ARM/LEG 1.1-2.0 CM  2/07    right forearm melanoma     KIDNEY SURGERY      r renal artery stent     PVD STENTING  summer 2010    4 stents total, 2 different procedures       Family History   Problem Relation Age of Onset     Cardiovascular Father      DIABETES Mother      Breast Cancer Mother      Hypertension Mother      Unknown/Adopted Daughter      Cardiovascular Maternal Uncle      Hypertension Sister      Glaucoma No family hx of      Macular Degeneration No family hx of        Social History   Substance Use Topics     Smoking status: Current Every Day Smoker     Packs/day: 1.00     Years: 50.00     Types: Cigarettes     Smokeless tobacco: Never Used     Alcohol use No         Pulse 64  Wt 73.9 kg (163 lb)  SpO2 99%  BMI 25.53 kg/m2.  A&O X 3.  Good historian.  Pulses DP, PT 0/4 b/l.  CRT < 3 seconds X 10 digits.  ankle edema or varicosities noted.  Sensation to light touch intact b/l.  Reflexes 2/4 b/l.  Skin thin no hair.  Left sub 1/5 calluses.  No forefoot or rear foot deformities noted.  MS 5/5 all compartments.  Normal ROM all fore foot and rearfoot joints.  No equinus.  right first nail thickened, elongated, discolored, with subungual debris.  No erythema, edema, drainage, pain on palpation.  No signs of subungual masses or  exostosis and nailbed healthy.    A/P  Onychomycosis          Pain          Left foot calluses x 2    Discussed all options with patient.  Mycotic nail manually debrided with a .  Patient will keep this debrided/filed down.  ABN signed.  Calluses debrided with a fifteen blade.   RETURN TO CLINIC prn.    Gustavo Martínez DPM, FACFAS      Again, thank you for allowing me to participate in the care of your patient.        Sincerely,        Gustavo Martínez DPM

## 2017-10-05 NOTE — PATIENT INSTRUCTIONS
SMOKING CESSATION    What's in cigarette smoke? - Cigarette smoke contains over 4,000 chemicals. Nicotine is one of the main ingredients which is an insecticide/herbicide. It is poisonous to our nervous system, increases blood clotting risk, and decreases the body's defenses to fight off infection. Another chemical is Carbon Monoxide is an asphyxiating gas that permanently binds to blood cells and blocks the transport of oxygen. This leads to tissue death and decreases your metabolism. Tar is a chemical that coats your lungs and trachea which impairs new oxygen coming in and carbon dioxide getting out of your body.   How does smoking impact surgery? - Smoking is particularly hazardous with regards to surgery. Surgery puts stress on the body and a smoker's body is already under strain from these chemicals. Putting the two together, especially for an elective surgery, could be a recipe for disaster. Smoking before and after surgery increases your risk of heart problems, slow wound healing, delayed bone healing, blood clots, wound infection and anesthesia complications.   What are the benefits of quitting? - In 20 minutes your blood pressure will drop back down to normal. In 8 hours the carbon monoxide (a toxic gas) levels in your blood stream will drop by half, and oxygen levels will return to normal. In 48 hours your chance of having a heart attack will have decreased. All nicotine will have left your body. Your sense of taste and smell will return to a normal level. In 72 hours your bronchial tubes will relax, and your energy levels will increase. In 2 weeks your circulation will increase, and it will continue to improve for the next 10 weeks.    Recommendations for elective surgery - Ideally, patients should quit smoking 8 weeks before and at least 2 weeks after elective surgery in order to avoid complications. Simply cutting back on the amount of cigarettes smoked per day does not offer any benefit or decrease the  risk of poor wound healing, heart problems, and infection. Smokers should also start taking Vitamin C and B for two weeks before surgery and two weeks after surgery.    Ways to Stop Smokin. Nicotine patches, lozenges, or gum  2. Support Groups  3. Medications (see below)    List of Medications:  1. Varenicline Tartrate (CHANTIX)   2. Bupropion HCL (WELLBUTRIN, ZYBAN)   note: make sure Wellbutrin is for smoking cessation and not other issues   3. Nicotine Patch (NICODERM)   4. Nicotine Inhaler (NICOTROL)   5. Nicotine Gum Nicotine Polacrilex   6. Nicotine Lozenge: Nicotine Polacrilex (COMMIT)   * [employer] offers a smoking support group as well!  Please visit: [email or web address]  If you are interested in these, ask about getting a prescription or talk to your primary care doctor about what may be the best way for you to quit.       We wish you continued good healing. If you have any questions or concerns, please do not hesitate to contact us at [phone]      Please remember to call and schedule a follow up appointment if one was recommended at your earliest convenience.   PODIATRY CLINIC HOURS  TELEPHONE NUMBER    Dr. Gustavo FLAHERTYPGARLAND [facility]    Clinics:  [facility]        Mireya Martin MA  Medical Assistant  Tuesday 1PM-6PM  [facility]  Wednesday 7AM-2PM  [place]  Thursday 10AM-6PM  [place] 7AM-345PM  [place]  Specialty schedulers:   [phone] to make an appointment with any Specialty Provider.        Urgent Care locations:    [facility] Monday-Friday 5 pm - 9 pm. Saturday- 9 am -5pm    Monday-Friday 11 am - 9 pm Saturday 9 am - 5 pm     Monday- 12 noon-8PM [phone]     If you need a medication refill, please contact us you may need lab work and/or a follow up visit prior to your refill (i.e. Antifungal  medications).    [email or web address] (secure e-mail communication and access to your chart) to send a message or to make an appointment.    If MRI needed please call [facility] at [phone]        Weight management plan: Patient was referred to their PCP to discuss a diet and exercise plan.

## 2017-10-05 NOTE — PROGRESS NOTES
Patient complains of thick nail on right first toe.  Has had this for 2 months .  It is slowly getting worse.  Has had pain in the past which is aggravated by activity and relieved by rest.  Describes as a burning pain.  Hard to walk in shoes now because of the pain. Tried over the counter medications without success.  Does not like the look of the nail and is wondering about options.  Also would like calluses debrided left foot and we had him sign ABN before seeing him today.  He has PAD.      ROS:  No hx of erythema, edema, drainage.         Allergies   Allergen Reactions     Ace Inhibitors      COUGH     Bacitracin      Ceftin      Crestor [Hmg-Coa-R Inhibitors]      Septra [Bactrim]        Current Outpatient Prescriptions   Medication Sig Dispense Refill     predniSONE (DELTASONE) 2.5 MG tablet TAKE 2 TABS BY MOUTH ONCE DAILY 180 tablet 0     hydrochlorothiazide (HYDRODIURIL) 25 MG tablet TAKE 1 TABLET (25 MG) BY MOUTH DAILY 90 tablet 0     simvastatin (ZOCOR) 40 MG tablet TAKE 1 TABLET (40 MG) BY MOUTH AT BEDTIME 90 tablet 0     atenolol (TENORMIN) 25 MG tablet TAKE THREE TABLETS BY MOUTH ONCE DAILY 270 tablet 0     potassium chloride SA (K-DUR/KLOR-CON M) 20 MEQ CR tablet TAKE 1 TABLET BY MOUTH DAILY 90 tablet 1     amLODIPine (NORVASC) 10 MG tablet TAKE 1 TABLET (10 MG) BY MOUTH DAILY 90 tablet 3     Cholecalciferol (VITAMIN D) 1000 UNITS capsule Take 2 capsules by mouth daily.       aspirin 81 MG tablet Take 1 tablet by mouth daily.         Patient Active Problem List   Diagnosis     Leukocytoclastic vasculitis (H)     Peripheral vascular disease (H)     HYPERLIPIDEMIA LDL GOAL <100     Tobacco abuse     Palindromic rheumatism     Advanced directives, counseling/discussion     Hypertension goal BP (blood pressure) < 140/90     Cataract     Carotid stenosis     Neurogenic bladder     Hypertension     Peripheral arterial occlusive disease (H)     Abnormal cardiovascular stress test       Past Medical History:    Diagnosis Date     Carotid stenosis     left     Condyloma acuminata      Dermatitis     metal-contact     Hemorrhoids      History of depression      HTN      Hyperlipemia      Malignant melanoma nos 2-07     Peripheral vascular disease (H)      Tobacco dependence      Urinary retention     self cath     VENTRAL HERNIA        Past Surgical History:   Procedure Laterality Date     HC EXC MALIG SKIN LESION TRUNK/ARM/LEG 1.1-2.0 CM  2/07    right forearm melanoma     KIDNEY SURGERY      r renal artery stent     PVD STENTING  summer 2010    4 stents total, 2 different procedures       Family History   Problem Relation Age of Onset     Cardiovascular Father      DIABETES Mother      Breast Cancer Mother      Hypertension Mother      Unknown/Adopted Daughter      Cardiovascular Maternal Uncle      Hypertension Sister      Glaucoma No family hx of      Macular Degeneration No family hx of        Social History   Substance Use Topics     Smoking status: Current Every Day Smoker     Packs/day: 1.00     Years: 50.00     Types: Cigarettes     Smokeless tobacco: Never Used     Alcohol use No         Pulse 64  Wt 73.9 kg (163 lb)  SpO2 99%  BMI 25.53 kg/m2.  A&O X 3.  Good historian.  Pulses DP, PT 0/4 b/l.  CRT < 3 seconds X 10 digits.  ankle edema or varicosities noted.  Sensation to light touch intact b/l.  Reflexes 2/4 b/l.  Skin thin no hair.  Left sub 1/5 calluses.  No forefoot or rear foot deformities noted.  MS 5/5 all compartments.  Normal ROM all fore foot and rearfoot joints.  No equinus.  right first nail thickened, elongated, discolored, with subungual debris.  No erythema, edema, drainage, pain on palpation.  No signs of subungual masses or exostosis and nailbed healthy.    A/P  Onychomycosis          Pain          Left foot calluses x 2    Discussed all options with patient.  Mycotic nail manually debrided with a .  Patient will keep this debrided/filed down.  ABN signed.  Calluses debrided with a  fifteen blade.   RETURN TO CLINIC prn.    Gustavo Martínez DPM, FACFAS

## 2017-10-12 ENCOUNTER — OFFICE VISIT (OUTPATIENT)
Dept: PODIATRY | Facility: CLINIC | Age: 77
End: 2017-10-12
Payer: MEDICARE

## 2017-10-12 VITALS — HEART RATE: 62 BPM | BODY MASS INDEX: 25.53 KG/M2 | OXYGEN SATURATION: 99 % | WEIGHT: 163 LBS

## 2017-10-12 DIAGNOSIS — I77.9 PERIPHERAL ARTERIAL OCCLUSIVE DISEASE (H): ICD-10-CM

## 2017-10-12 DIAGNOSIS — M77.8 CAPSULITIS OF FOOT, LEFT: Primary | ICD-10-CM

## 2017-10-12 PROCEDURE — 99213 OFFICE O/P EST LOW 20 MIN: CPT | Performed by: PODIATRIST

## 2017-10-12 NOTE — MR AVS SNAPSHOT
After Visit Summary   10/12/2017    Mendoza Shaikh    MRN: [id number]           Patient Information     Date Of Birth          1940        Visit Information        Provider Department      10/12/2017 10:45 AM Gustavo Martínez, XIMENA [facility]        Care Instructions    We wish you continued good healing. If you have any questions or concerns, please do not hesitate to contact us at [phone]      Please remember to call and schedule a follow up appointment if one was recommended at your earliest convenience.   PODIATRY CLINIC HOURS  TELEPHONE NUMBER    Dr. Gustavo Martínez D.P.M FAC FAS    Clinics:  [facility]        Mireya Martin MA  Medical Assistant  Tuesday 1PM-6PM  [place]  Wednesday 7AM-2PM  [place]  Thursday 10AM-6PM  [place]  Friday 7AM-345PM  [place]  Specialty schedulers:   [phone] to make an appointment with any Specialty Provider.        Urgent Care locations:    [facility] Monday-Friday 5 pm - 9 pm. Saturday-Sunday 9 am -5pm    Monday-Friday 11 am - 9 pm Saturday 9 am - 5 pm     Monday-Sunday 12 noon-8PM [phone]     If you need a medication refill, please contact us you may need lab work and/or a follow up visit prior to your refill (i.e. Antifungal medications).    [employer] (secure e-mail communication and access to your chart) to send a message or to make an appointment.    If MRI needed please call [facility] at [phone]        Weight management plan: Patient was referred to their PCP to discuss a diet and exercise plan.            Follow-ups after your visit        Who to contact     If you have questions or need follow up information about today's clinic visit or your schedule please contact [facility] directly at [phone].  Normal or non-critical lab and imaging results  "will be communicated to you by MyChart, letter or phone within 4 business days after the clinic has received the results. If you do not hear from us within 7 days, please contact the clinic through [employer] or phone. If you have a critical or abnormal lab result, we will notify you by phone as soon as possible.  Submit refill requests through [employer] or call your pharmacy and they will forward the refill request to us. Please allow 3 business days for your refill to be completed.          Additional Information About Your Visit        [employer] Information     [employer] lets you send messages to your doctor, view your test results, renew your prescriptions, schedule appointments and more. To sign up, go to [email or web address] . Click on \"Log in\" on the left side of the screen, which will take you to the Welcome page. Then click on \"Sign up Now\" on the right side of the page.     You will be asked to enter the access code listed below, as well as some personal information. Please follow the directions to create your username and password.     Your access code is: [email or web address]  Expires: 1/3/2018  2:23 PM     Your access code will  in 90 days. If you need help or a new code, please call your [facility] clinic or [phone].        Care EveryWhere ID     This is your Care EveryWhere ID. This could be used by other organizations to access your [facility] medical records  [id number]        Your Vitals Were     Pulse Pulse Oximetry BMI (Body Mass Index)             62 99% 25.53 kg/m2          Blood Pressure from Last 3 Encounters:   17 155/80   17 125/70   16 168/74    Weight from Last 3 Encounters:   10/12/17 73.9 kg (163 lb)   10/05/17 73.9 kg (163 lb)   17 78.9 kg (174 lb)              Today, you had the following     No orders found for display       Primary Care Provider Office Phone # Fax #    Mitch Herrera [phone]       [address] [facility] " [id number]        Equal Access to Services     [facility]: Hadii shaheen velazco maría Salas, waaxda luqadaha, qaybta kaalmada shareehudebora, sigifredo kelleyroseannaspencer brandt. So [facility] [phone].    ATENCIÓN: Si habla español, tiene a gonsales disposición servicios gratuitos de asistencia lingüística. Meena al [phone].    We comply with applicable federal civil rights laws and [place] laws. We do not discriminate on the basis of race, color, national origin, age, disability, sex, sexual orientation, or gender identity.            Thank you!     Thank you for choosing [facility]  for your care. Our goal is always to provide you with excellent care. Hearing back from our patients is one way we can continue to improve our services. Please take a few minutes to complete the written survey that you may receive in the mail after your visit with us. Thank you!             Your Updated Medication List - Protect others around you: Learn how to safely use, store and throw away your medicines at www.disposemymeds.org.          This list is accurate as of: 10/12/17 10:58 AM.  Always use your most recent med list.                   Brand Name Dispense Instructions for use Diagnosis    amLODIPine 10 MG tablet    NORVASC    90 tablet    TAKE 1 TABLET (10 MG) BY MOUTH DAILY    HTN (hypertension)       aspirin 81 MG tablet      Take 1 tablet by mouth daily.        atenolol 25 MG tablet    TENORMIN    270 tablet    TAKE THREE TABLETS BY MOUTH ONCE DAILY    Hypertension goal BP (blood pressure) < 140/90       hydrochlorothiazide 25 MG tablet    HYDRODIURIL    90 tablet    TAKE 1 TABLET (25 MG) BY MOUTH DAILY    Hypertension goal BP (blood pressure) < 140/90       potassium chloride SA 20 MEQ CR tablet    K-DUR/KLOR-CON M    90 tablet    TAKE 1 TABLET BY MOUTH DAILY    Hypokalemia       predniSONE 2.5 MG tablet    DELTASONE    180 tablet    TAKE 2 TABS BY MOUTH ONCE DAILY    Leukocytoclastic vasculitis (H)        simvastatin 40 MG tablet    ZOCOR    90 tablet    TAKE 1 TABLET (40 MG) BY MOUTH AT BEDTIME    Hyperlipidemia LDL goal <100       vitamin D 1000 UNITS capsule      Take 2 capsules by mouth daily.

## 2017-10-12 NOTE — PROGRESS NOTES
DATE OF VISIT:  10/12/2017       Mendoza Shaikh returns for left foot pain.  He points to the area of his fifth met head.  It is aggravated by activity and relieved by rest.  He had a callus here which we trimmed approximately 2 weeks ago.  He says this has helped, but he is still having pain.  He states that he put a new pad inside of his shoes and now is getting pain in his nails and some bruising on the top of his toes.  He states that his shoes are at least a year old and he is getting new shoes next week.  He has a history of peripheral arterial disease.  He says he had stents in his leg approximately 6 years ago.  He is still smoking.  He notices that with raking leaves this fall he has had increased pain in his left calf.      REVIEW OF SYSTEMS:  He denies any shortness of breath.  He denies any fever or chills.  He denies any foot ulceration or drainage.        Allergies   Allergen Reactions     Ace Inhibitors      COUGH     Bacitracin      Ceftin      Crestor [Hmg-Coa-R Inhibitors]      Septra [Bactrim]        Current Outpatient Prescriptions   Medication Sig Dispense Refill     predniSONE (DELTASONE) 2.5 MG tablet TAKE 2 TABS BY MOUTH ONCE DAILY 180 tablet 0     hydrochlorothiazide (HYDRODIURIL) 25 MG tablet TAKE 1 TABLET (25 MG) BY MOUTH DAILY 90 tablet 0     simvastatin (ZOCOR) 40 MG tablet TAKE 1 TABLET (40 MG) BY MOUTH AT BEDTIME 90 tablet 0     atenolol (TENORMIN) 25 MG tablet TAKE THREE TABLETS BY MOUTH ONCE DAILY 270 tablet 0     potassium chloride SA (K-DUR/KLOR-CON M) 20 MEQ CR tablet TAKE 1 TABLET BY MOUTH DAILY 90 tablet 1     amLODIPine (NORVASC) 10 MG tablet TAKE 1 TABLET (10 MG) BY MOUTH DAILY 90 tablet 3     Cholecalciferol (VITAMIN D) 1000 UNITS capsule Take 2 capsules by mouth daily.       aspirin 81 MG tablet Take 1 tablet by mouth daily.         Patient Active Problem List   Diagnosis     Leukocytoclastic vasculitis (H)     Peripheral vascular disease (H)     HYPERLIPIDEMIA LDL GOAL  <100     Tobacco abuse     Palindromic rheumatism     Advanced directives, counseling/discussion     Hypertension goal BP (blood pressure) < 140/90     Cataract     Carotid stenosis     Neurogenic bladder     Hypertension     Peripheral arterial occlusive disease (H)     Abnormal cardiovascular stress test       Past Medical History:   Diagnosis Date     Carotid stenosis     left     Condyloma acuminata      Dermatitis     metal-contact     Hemorrhoids      History of depression      HTN      Hyperlipemia      Malignant melanoma nos 2-07     Peripheral vascular disease (H)      Tobacco dependence      Urinary retention     self cath     VENTRAL HERNIA        Past Surgical History:   Procedure Laterality Date     HC EXC MALIG SKIN LESION TRUNK/ARM/LEG 1.1-2.0 CM  2/07    right forearm melanoma     KIDNEY SURGERY      r renal artery stent     PVD STENTING  summer 2010    4 stents total, 2 different procedures       Family History   Problem Relation Age of Onset     Cardiovascular Father      DIABETES Mother      Breast Cancer Mother      Hypertension Mother      Unknown/Adopted Daughter      Cardiovascular Maternal Uncle      Hypertension Sister      Glaucoma No family hx of      Macular Degeneration No family hx of        Social History   Substance Use Topics     Smoking status: Current Every Day Smoker     Packs/day: 1.00     Years: 50.00     Types: Cigarettes     Smokeless tobacco: Never Used     Alcohol use No          PHYSICAL EXAMINATION:     VITAL SIGNS:  His pulse is 62, his SpO2 is 99%.  He weighs 163 pounds with a BMI of 25.53.     EXTREMITIES:  We cannot palpate his pupils pedal pulses.  Capillary refill is about 3 seconds on the digits.  He has ankle edema and varicosities.  His skin is thin and atrophic with no hair growth noted.  On his left foot, his second and third toes are slightly hammered.  On the dorsum of the PIPJs, there are some ecchymotic areas.  Some other small forefoot bruises are noted.   Under his left fifth met head, there is really no more callus left to trim here.  His fat pad is essentially gone here and with palpation he has skin and bones.  There is no ulceration or breakdown at all.      ASSESSMENT:   1.  Peripheral arterial disease.   2.  Ecchymosis on his toes from pressure.   3.  Left sub fifth met head capsulitis.      PLAN:  I discussed with the patient he is getting too much pressure on his shoes.  He has 2 pads in his shoe.  We took 1 of these out.  He will get new shoes and hopefully this will help his sub fifth pain.  We told I want to make sure he has plenty of room in his shoes.  We gave him a dancer's pad to offload the fifth met head, but he will only wear this in his new shoes and then only if he needs it.  We told him this will take up room and we do not want any additional pressure on his toes.  We are going to have the patient see his vascular surgeon regarding his calf pain.  We will have him RTC poni CLEMENTS DPM             D: 10/12/2017 12:32   T: 10/12/2017 13:31   MT: CHANELL      Name:     BASSEM GRACE   MRN:      [id number]        Account:      [id number]   :      1940           Visit Date:   10/12/2017      Document: [id number]

## 2017-10-12 NOTE — PATIENT INSTRUCTIONS
We wish you continued good healing. If you have any questions or concerns, please do not hesitate to contact us at [phone]      Please remember to call and schedule a follow up appointment if one was recommended at your earliest convenience.   PODIATRY CLINIC HOURS  TELEPHONE NUMBER    Dr. Gustavo Martínez D.P.M FAC FAS    Clinics:  [facility]        Mireya Martin MA  Medical Assistant  Tuesday 1PM-6PM  [place]  Wednesday 7AM-2PM  [place]  Thursday 10AM-6PM  [place] Friday 7AM-345PM  [place]  Specialty schedulers:   [phone] to make an appointment with any Specialty Provider.        Urgent Care locations:    [facility] Monday-Friday 5 pm - 9 pm. Saturday-Sunday 9 am -5pm    Monday-Friday 11 am - 9 pm Saturday 9 am - 5 pm     Monday-Sunday 12 noon-8PM [phone]     If you need a medication refill, please contact us you may need lab work and/or a follow up visit prior to your refill (i.e. Antifungal medications).    [employer] (secure e-mail communication and access to your chart) to send a message or to make an appointment.    If MRI needed please call [facility] at [phone]        Weight management plan: Patient was referred to their PCP to discuss a diet and exercise plan.    SMOKING CESSATION    What's in cigarette smoke? - Cigarette smoke contains over 4,000 chemicals. Nicotine is one of the main ingredients which is an insecticide/herbicide. It is poisonous to our nervous system, increases blood clotting risk, and decreases the body's defenses to fight off infection. Another chemical is Carbon Monoxide is an asphyxiating gas that permanently binds to blood cells and blocks the transport of oxygen. This leads to tissue death and decreases your metabolism. Tar is a chemical that coats your lungs and trachea which impairs new oxygen coming in and  carbon dioxide getting out of your body.   How does smoking impact surgery? - Smoking is particularly hazardous with regards to surgery. Surgery puts stress on the body and a smoker's body is already under strain from these chemicals. Putting the two together, especially for an elective surgery, could be a recipe for disaster. Smoking before and after surgery increases your risk of heart problems, slow wound healing, delayed bone healing, blood clots, wound infection and anesthesia complications.   What are the benefits of quitting? - In 20 minutes your blood pressure will drop back down to normal. In 8 hours the carbon monoxide (a toxic gas) levels in your blood stream will drop by half, and oxygen levels will return to normal. In 48 hours your chance of having a heart attack will have decreased. All nicotine will have left your body. Your sense of taste and smell will return to a normal level. In 72 hours your bronchial tubes will relax, and your energy levels will increase. In 2 weeks your circulation will increase, and it will continue to improve for the next 10 weeks.    Recommendations for elective surgery - Ideally, patients should quit smoking 8 weeks before and at least 2 weeks after elective surgery in order to avoid complications. Simply cutting back on the amount of cigarettes smoked per day does not offer any benefit or decrease the risk of poor wound healing, heart problems, and infection. Smokers should also start taking Vitamin C and B for two weeks before surgery and two weeks after surgery.    Ways to Stop Smokin. Nicotine patches, lozenges, or gum  2. Support Groups  3. Medications (see below)    List of Medications:  1. Varenicline Tartrate (CHANTIX)   2. Bupropion HCL (WELLBUTRIN, ZYBAN)   note: make sure Wellbutrin is for smoking cessation and not other issues   3. Nicotine Patch (NICODERM)   4. Nicotine Inhaler (NICOTROL)   5. Nicotine Gum Nicotine Polacrilex   6. Nicotine Lozenge:  Nicotine Polacrilex (COMMIT)   * [place] offers a smoking support group as well!  Please visit: [email or web address]  If you are interested in these, ask about getting a prescription or talk to your primary care doctor about what may be the best way for you to quit.

## 2017-10-12 NOTE — LETTER
10/12/2017         RE: Mendoza Shaikh  [address] [place] [address]        Dear Colleague,    Thank you for referring your patient, Mendoza Shaikh, to the [facility]. Please see a copy of my visit note below.    See dictation    Again, thank you for allowing me to participate in the care of your patient.        Sincerely,        Gustavo Martínez DPM

## 2017-10-12 NOTE — NURSING NOTE
"Chief Complaint   Patient presents with     Follow Up For     Foot Problems     callus on left foot     Peripheral Vascular Disease       Initial Pulse 62  Wt 73.9 kg (163 lb)  SpO2 99%  BMI 25.53 kg/m2 Estimated body mass index is 25.53 kg/(m^2) as calculated from the following:    Height as of 2/10/17: 1.702 m (5' 7\").    Weight as of this encounter: 73.9 kg (163 lb).  Medication Reconciliation: complete  "

## 2017-10-15 DIAGNOSIS — E78.5 HYPERLIPIDEMIA LDL GOAL <100: ICD-10-CM

## 2017-10-16 RX ORDER — SIMVASTATIN 40 MG
TABLET ORAL
Qty: 90 TABLET | Refills: 0 | Status: SHIPPED | OUTPATIENT
Start: 2017-10-16 | End: 2017-10-31

## 2017-10-16 NOTE — TELEPHONE ENCOUNTER
simvastatin (ZOCOR) 40 MG tablet     Last Written Prescription Date: 7/19/17  Last Fill Quantity: 90, # refills: 0  Last Office Visit with G, P or [facility] prescribing provider: 4/14/17       Lab Results   Component Value Date    CHOL 182 06/27/2016     Lab Results   Component Value Date    HDL 56 06/27/2016     Lab Results   Component Value Date     06/27/2016     Lab Results   Component Value Date    TRIG 101 06/27/2016     Lab Results   Component Value Date    CHOLHDLRATIO 3.6 06/08/2015

## 2017-10-22 ENCOUNTER — TELEPHONE (OUTPATIENT)
Dept: FAMILY MEDICINE | Facility: CLINIC | Age: 77
End: 2017-10-22

## 2017-10-22 DIAGNOSIS — E78.5 HYPERLIPIDEMIA LDL GOAL <100: ICD-10-CM

## 2017-10-24 RX ORDER — SIMVASTATIN 40 MG
TABLET ORAL
Qty: 90 TABLET | Refills: 0 | Status: SHIPPED | OUTPATIENT
Start: 2017-10-24 | End: 2017-10-31

## 2017-10-24 NOTE — TELEPHONE ENCOUNTER
Okay refill but advise clinic appointment in next couple months    Please inform patient    Mitch Herrera MD

## 2017-10-24 NOTE — TELEPHONE ENCOUNTER
Routing refill request to provider for review/approval because:  Labs out of range and not current:  LDL      Fany Nguyễn RN  [facility]

## 2017-10-25 ENCOUNTER — TRANSFERRED RECORDS (OUTPATIENT)
Dept: HEALTH INFORMATION MANAGEMENT | Facility: CLINIC | Age: 77
End: 2017-10-25

## 2017-10-25 NOTE — TELEPHONE ENCOUNTER
Message left on home number for patient to call back TC line.  NTBS for fasting labs and BP check with PCP.    Yanira ELLISON

## 2017-10-26 NOTE — TELEPHONE ENCOUNTER
Patient returned call. Message below communicated to patient. Patient has to be admitted to hospital tomorrow so he will call back to schedule.

## 2017-10-31 ENCOUNTER — OFFICE VISIT (OUTPATIENT)
Dept: FAMILY MEDICINE | Facility: CLINIC | Age: 77
End: 2017-10-31
Payer: MEDICARE

## 2017-10-31 VITALS
SYSTOLIC BLOOD PRESSURE: 145 MMHG | OXYGEN SATURATION: 98 % | WEIGHT: 161.25 LBS | HEART RATE: 67 BPM | TEMPERATURE: 97 F | DIASTOLIC BLOOD PRESSURE: 70 MMHG | BODY MASS INDEX: 25.26 KG/M2

## 2017-10-31 DIAGNOSIS — R73.01 IMPAIRED FASTING GLUCOSE: ICD-10-CM

## 2017-10-31 DIAGNOSIS — Z23 NEED FOR PROPHYLACTIC VACCINATION AND INOCULATION AGAINST INFLUENZA: ICD-10-CM

## 2017-10-31 DIAGNOSIS — I10 HYPERTENSION GOAL BP (BLOOD PRESSURE) < 140/90: ICD-10-CM

## 2017-10-31 DIAGNOSIS — M31.0 LEUKOCYTOCLASTIC VASCULITIS (H): ICD-10-CM

## 2017-10-31 DIAGNOSIS — Z72.0 TOBACCO ABUSE: ICD-10-CM

## 2017-10-31 DIAGNOSIS — E87.6 HYPOKALEMIA: ICD-10-CM

## 2017-10-31 DIAGNOSIS — E78.5 HYPERLIPIDEMIA LDL GOAL <100: ICD-10-CM

## 2017-10-31 DIAGNOSIS — I77.9 PERIPHERAL ARTERIAL OCCLUSIVE DISEASE (H): Primary | ICD-10-CM

## 2017-10-31 DIAGNOSIS — R53.83 FATIGUE, UNSPECIFIED TYPE: ICD-10-CM

## 2017-10-31 LAB
ALBUMIN SERPL-MCNC: 3.5 G/DL (ref 3.4–5)
ALP SERPL-CCNC: 76 U/L (ref 40–150)
ALT SERPL W P-5'-P-CCNC: 24 U/L (ref 0–70)
ANION GAP SERPL CALCULATED.3IONS-SCNC: 9 MMOL/L (ref 3–14)
AST SERPL W P-5'-P-CCNC: 18 U/L (ref 0–45)
BASOPHILS # BLD AUTO: 0 10E9/L (ref 0–0.2)
BASOPHILS NFR BLD AUTO: 0.4 %
BILIRUB SERPL-MCNC: 0.6 MG/DL (ref 0.2–1.3)
BUN SERPL-MCNC: 25 MG/DL (ref 7–30)
CALCIUM SERPL-MCNC: 9.6 MG/DL (ref 8.5–10.1)
CHLORIDE SERPL-SCNC: 102 MMOL/L (ref 94–109)
CHOLEST SERPL-MCNC: 163 MG/DL
CO2 SERPL-SCNC: 26 MMOL/L (ref 20–32)
CREAT SERPL-MCNC: 1.25 MG/DL (ref 0.66–1.25)
DIFFERENTIAL METHOD BLD: ABNORMAL
EOSINOPHIL # BLD AUTO: 0.2 10E9/L (ref 0–0.7)
EOSINOPHIL NFR BLD AUTO: 2.4 %
ERYTHROCYTE [DISTWIDTH] IN BLOOD BY AUTOMATED COUNT: 13.5 % (ref 10–15)
GFR SERPL CREATININE-BSD FRML MDRD: 56 ML/MIN/1.7M2
GLUCOSE SERPL-MCNC: 100 MG/DL (ref 70–99)
HBA1C MFR BLD: 5.9 % (ref 4.3–6)
HCT VFR BLD AUTO: 44.7 % (ref 40–53)
HDLC SERPL-MCNC: 58 MG/DL
HGB BLD-MCNC: 15.3 G/DL (ref 13.3–17.7)
LDLC SERPL CALC-MCNC: 87 MG/DL
LYMPHOCYTES # BLD AUTO: 1.2 10E9/L (ref 0.8–5.3)
LYMPHOCYTES NFR BLD AUTO: 12.2 %
MCH RBC QN AUTO: 33.1 PG (ref 26.5–33)
MCHC RBC AUTO-ENTMCNC: 34.2 G/DL (ref 31.5–36.5)
MCV RBC AUTO: 97 FL (ref 78–100)
MONOCYTES # BLD AUTO: 1.2 10E9/L (ref 0–1.3)
MONOCYTES NFR BLD AUTO: 11.7 %
NEUTROPHILS # BLD AUTO: 7.3 10E9/L (ref 1.6–8.3)
NEUTROPHILS NFR BLD AUTO: 73.3 %
NONHDLC SERPL-MCNC: 105 MG/DL
PLATELET # BLD AUTO: 169 10E9/L (ref 150–450)
POTASSIUM SERPL-SCNC: 3.7 MMOL/L (ref 3.4–5.3)
PROT SERPL-MCNC: 7.5 G/DL (ref 6.8–8.8)
RBC # BLD AUTO: 4.62 10E12/L (ref 4.4–5.9)
SODIUM SERPL-SCNC: 137 MMOL/L (ref 133–144)
TRIGL SERPL-MCNC: 88 MG/DL
TSH SERPL DL<=0.005 MIU/L-ACNC: 1.7 MU/L (ref 0.4–4)
WBC # BLD AUTO: 9.9 10E9/L (ref 4–11)

## 2017-10-31 PROCEDURE — 80061 LIPID PANEL: CPT | Performed by: FAMILY MEDICINE

## 2017-10-31 PROCEDURE — 36415 COLL VENOUS BLD VENIPUNCTURE: CPT | Performed by: FAMILY MEDICINE

## 2017-10-31 PROCEDURE — 80050 GENERAL HEALTH PANEL: CPT | Performed by: FAMILY MEDICINE

## 2017-10-31 PROCEDURE — 99214 OFFICE O/P EST MOD 30 MIN: CPT | Mod: 25 | Performed by: FAMILY MEDICINE

## 2017-10-31 PROCEDURE — G0008 ADMIN INFLUENZA VIRUS VAC: HCPCS | Performed by: FAMILY MEDICINE

## 2017-10-31 PROCEDURE — 83036 HEMOGLOBIN GLYCOSYLATED A1C: CPT | Performed by: FAMILY MEDICINE

## 2017-10-31 PROCEDURE — 90662 IIV NO PRSV INCREASED AG IM: CPT | Performed by: FAMILY MEDICINE

## 2017-10-31 RX ORDER — VARENICLINE TARTRATE 1 MG/1
1 TABLET, FILM COATED ORAL 2 TIMES DAILY
Qty: 60 TABLET | Refills: 2 | Status: SHIPPED | OUTPATIENT
Start: 2017-10-31

## 2017-10-31 RX ORDER — POTASSIUM CHLORIDE 1500 MG/1
20 TABLET, EXTENDED RELEASE ORAL DAILY
Qty: 90 TABLET | Refills: 1 | Status: SHIPPED | OUTPATIENT
Start: 2017-10-31 | End: 2018-05-02

## 2017-10-31 RX ORDER — HYDROCHLOROTHIAZIDE 25 MG/1
TABLET ORAL
Qty: 90 TABLET | Refills: 1 | Status: SHIPPED | OUTPATIENT
Start: 2017-10-31 | End: 2018-05-02

## 2017-10-31 RX ORDER — SIMVASTATIN 40 MG
TABLET ORAL
Qty: 90 TABLET | Refills: 1 | Status: SHIPPED | OUTPATIENT
Start: 2017-10-31 | End: 2018-05-02

## 2017-10-31 RX ORDER — ATENOLOL 25 MG/1
TABLET ORAL
Qty: 270 TABLET | Refills: 1 | Status: SHIPPED | OUTPATIENT
Start: 2017-10-31 | End: 2018-05-02

## 2017-10-31 RX ORDER — CLOPIDOGREL BISULFATE 75 MG/1
75 TABLET ORAL DAILY
COMMUNITY
Start: 2017-10-27

## 2017-10-31 ASSESSMENT — PAIN SCALES - GENERAL: PAINLEVEL: NO PAIN (0)

## 2017-10-31 NOTE — NURSING NOTE
"Chief Complaint   Patient presents with     Hypertension     Health Maintenance       Initial /65 (BP Location: Right arm, Patient Position: Chair, Cuff Size: Adult Regular)  Pulse 67  Temp 97  F (36.1  C) (Oral)  Wt 161 lb 4 oz (73.1 kg)  SpO2 98%  BMI 25.26 kg/m2 Estimated body mass index is 25.26 kg/(m^2) as calculated from the following:    Height as of 2/10/17: 5' 7\" (1.702 m).    Weight as of this encounter: 161 lb 4 oz (73.1 kg).  Medication Reconciliation: complete   Sahra Ayala CMA      "

## 2017-10-31 NOTE — MR AVS SNAPSHOT
"              After Visit Summary   10/31/2017    Mendoza Shaikh    MRN: [id number]           Patient Information     Date Of Birth          1940        Visit Information        Provider Department      10/31/2017 9:20 AM Mitch Herrera MD [facility]        Today's Diagnoses     Need for prophylactic vaccination and inoculation against influenza    -  1    Hyperlipidemia LDL goal <100        Hypertension goal BP (blood pressure) < 140/90        Hypokalemia        Impaired fasting glucose        Fatigue, unspecified type        Tobacco abuse          Care Instructions    Stay on lifelong plavix ( clopidogrel ) one daily    Also take the 81 mg aspirin daily    Advise complete smoking cessation    We will send you lab results    Can start chantix              Follow-ups after your visit        Who to contact     If you have questions or need follow up information about today's clinic visit or your schedule please contact [facility] directly at [phone].  Normal or non-critical lab and imaging results will be communicated to you by MyChart, letter or phone within 4 business days after the clinic has received the results. If you do not hear from us within 7 days, please contact the clinic through MyChart or phone. If you have a critical or abnormal lab result, we will notify you by phone as soon as possible.  Submit refill requests through [employer] or call your pharmacy and they will forward the refill request to us. Please allow 3 business days for your refill to be completed.          Additional Information About Your Visit        MyChart Information     [employer] lets you send messages to your doctor, view your test results, renew your prescriptions, schedule appointments and more. To sign up, go to [email or web address] . Click on \"Log in\" on the left side of the screen, which will take you to the Welcome page. Then click on \"Sign up Now\" on the right " side of the page.     You will be asked to enter the access code listed below, as well as some personal information. Please follow the directions to create your username and password.     Your access code is: [email or web address]  Expires: 1/3/2018  2:23 PM     Your access code will  in 90 days. If you need help or a new code, please call your [place] clinic or [phone].        Care EveryWhere ID     This is your Care EveryWhere ID. This could be used by other organizations to access your [place] medical records  [id number]        Your Vitals Were     Pulse Temperature Pulse Oximetry BMI (Body Mass Index)          67 97  F (36.1  C) (Oral) 98% 25.26 kg/m2         Blood Pressure from Last 3 Encounters:   10/31/17 145/70   17 155/80   17 125/70    Weight from Last 3 Encounters:   10/31/17 161 lb 4 oz (73.1 kg)   10/12/17 163 lb (73.9 kg)   10/05/17 163 lb (73.9 kg)              We Performed the Following     ADMIN INFLUENZA (For MEDICARE Patients ONLY) []     CBC with platelets differential     Comprehensive metabolic panel     FLU VACCINE, INCREASED ANTIGEN, PRESV FREE, AGE 65+ [18045]     Hemoglobin A1c     Lipid panel reflex to direct LDL Fasting     TSH with free T4 reflex          Today's Medication Changes          These changes are accurate as of: 10/31/17 10:15 AM.  If you have any questions, ask your nurse or doctor.               Start taking these medicines.        Dose/Directions    varenicline 1 MG tablet   Commonly known as:  CHANTIX   Used for:  Tobacco abuse   Started by:  Mitch Herrera MD        Dose:  1 mg   Take 1 tablet (1 mg) by mouth 2 times daily   Quantity:  60 tablet   Refills:  2         These medicines have changed or have updated prescriptions.        Dose/Directions    atenolol 25 MG tablet   Commonly known as:  TENORMIN   This may have changed:  See the new instructions.   Used for:  Hypertension goal BP (blood pressure) < 140/90   Changed by:  Mitch Herrera,  MD        TAKE THREE TABLETS BY MOUTH ONCE DAILY   Quantity:  270 tablet   Refills:  1       hydrochlorothiazide 25 MG tablet   Commonly known as:  HYDRODIURIL   This may have changed:  See the new instructions.   Used for:  Hypertension goal BP (blood pressure) < 140/90   Changed by:  Mitch Herrera MD        TAKE 1 TABLET (25 MG) BY MOUTH DAILY   Quantity:  90 tablet   Refills:  1       potassium chloride SA 20 MEQ CR tablet   Commonly known as:  K-DUR/KLOR-CON M   This may have changed:  See the new instructions.   Used for:  Hypokalemia   Changed by:  Mitch Herrera MD        Dose:  20 mEq   Take 1 tablet (20 mEq) by mouth daily   Quantity:  90 tablet   Refills:  1       simvastatin 40 MG tablet   Commonly known as:  ZOCOR   This may have changed:  See the new instructions.   Used for:  Hyperlipidemia LDL goal <100   Changed by:  Mitch Herrera MD        TAKE 1 TABLET (40 MG) BY MOUTH AT BEDTIME   Quantity:  90 tablet   Refills:  1            Where to get your medicines      [facility] [place] [address] [place] [facility] [address]  [facility] [address] [facility] [place] [address]     Phone:  [phone]     atenolol 25 MG tablet    hydrochlorothiazide 25 MG tablet    potassium chloride SA 20 MEQ CR tablet    simvastatin 40 MG tablet         Some of these will need a paper prescription and others can be bought over the counter.  Ask your nurse if you have questions.     Bring a paper prescription for each of these medications     varenicline 1 MG tablet                Primary Care Provider Office Phone # Fax #    Mitch Herrera [phone]       [address]  [facility] [address]        Equal Access to Services     [facility]: Hadii shaheen daniel Soanand, waaxda luqadaha, qaybta kaalmada aderome, sigifredo idisergio brandt. So [facility] [phone].    ATENCIÓN: Si habla español, tiene a gonsales disposición servicios gratuitos de asistencia  lingüística. Meena al [phone].    We comply with applicable federal civil rights laws and [place] laws. We do not discriminate on the basis of race, color, national origin, age, disability, sex, sexual orientation, or gender identity.            Thank you!     Thank you for choosing [facility]  for your care. Our goal is always to provide you with excellent care. Hearing back from our patients is one way we can continue to improve our services. Please take a few minutes to complete the written survey that you may receive in the mail after your visit with us. Thank you!             Your Updated Medication List - Protect others around you: Learn how to safely use, store and throw away your medicines at www.disposemymeds.org.          This list is accurate as of: 10/31/17 10:15 AM.  Always use your most recent med list.                   Brand Name Dispense Instructions for use Diagnosis    amLODIPine 10 MG tablet    NORVASC    90 tablet    TAKE 1 TABLET (10 MG) BY MOUTH DAILY    HTN (hypertension)       aspirin 81 MG tablet      Take 1 tablet by mouth daily.        atenolol 25 MG tablet    TENORMIN    270 tablet    TAKE THREE TABLETS BY MOUTH ONCE DAILY    Hypertension goal BP (blood pressure) < 140/90       clopidogrel 75 MG tablet    PLAVIX     Take 75 mg by mouth daily        hydrochlorothiazide 25 MG tablet    HYDRODIURIL    90 tablet    TAKE 1 TABLET (25 MG) BY MOUTH DAILY    Hypertension goal BP (blood pressure) < 140/90       potassium chloride SA 20 MEQ CR tablet    K-DUR/KLOR-CON M    90 tablet    Take 1 tablet (20 mEq) by mouth daily    Hypokalemia       predniSONE 2.5 MG tablet    DELTASONE    180 tablet    TAKE 2 TABS BY MOUTH ONCE DAILY    Leukocytoclastic vasculitis (H)       simvastatin 40 MG tablet    ZOCOR    90 tablet    TAKE 1 TABLET (40 MG) BY MOUTH AT BEDTIME    Hyperlipidemia LDL goal <100       varenicline 1 MG tablet    CHANTIX    60 tablet    Take 1 tablet (1 mg)  by mouth 2 times daily    Tobacco abuse       vitamin D 1000 UNITS capsule      Take 2 capsules by mouth daily.

## 2017-10-31 NOTE — PATIENT INSTRUCTIONS
Stay on lifelong plavix ( clopidogrel ) one daily    Also take the 81 mg aspirin daily    Advise complete smoking cessation    We will send you lab results    Can start chantix

## 2017-10-31 NOTE — PROGRESS NOTES
SUBJECTIVE:   Mendoza Shaikh is a 77 year old male who presents to clinic today for the following health issues:       Hypertension Follow-up      Outpatient blood pressures are not being checked.    Low Salt Diet: no added salt        Amount of exercise or physical activity: None    Problems taking medications regularly: No    Medication side effects: none    Diet: regular (no restrictions)    ED/UC Followup:    Facility:  [facility]  Date of visit: 10/27/2017  Reason for visit: leg pain  Current Status: stable, he had stents put in           none    Problem list and histories reviewed & adjusted, as indicated.  Additional history: as documented         Reviewed and updated as needed this visit by clinical staffTobacco  Allergies  Meds  Med Hx  Surg Hx  Fam Hx  Soc Hx      Reviewed and updated as needed this visit by Provider              Injectable Influenza Immunization Documentation    1.  Is the person to be vaccinated sick today?   No    2. Does the person to be vaccinated have an allergy to a component   of the vaccine?   No  Egg Allergy Algorithm Link    3. Has the person to be vaccinated ever had a serious reaction   to influenza vaccine in the past?   No    4. Has the person to be vaccinated ever had Guillain-Barré syndrome?   No    Form completed by Sahra Ayala CMA'       4 days ago at [facility]    Reviewed hospital record in detail    Got several left leg arterial stents placed    Per dr. Diehl    Physical Exam   Constitutional: He is oriented to person, place, and time and well-developed, well-nourished, and in no distress. No distress.   HENT:   Head: Normocephalic and atraumatic.   Eyes: Conjunctivae are normal.   Neck: Carotid bruit is not present.   Cardiovascular: Normal rate, regular rhythm, normal heart sounds and intact distal pulses.  Exam reveals no gallop and no friction rub.    No murmur heard.  Pulmonary/Chest: Effort normal and breath sounds normal. No respiratory distress. He  has no wheezes. He has no rales.   Musculoskeletal: He exhibits no edema.   Neurological: He is alert and oriented to person, place, and time.   Skin: He is not diaphoretic.   Psychiatric: Mood and affect normal.   right groin site looks good.  Bandage in place.    A bit of bruising on forearms but no worse than usual      ASSESSMENT / PLAN:  (I77.9) Peripheral arterial occlusive disease (H)  (primary encounter diagnosis)  Comment: s/p multiple stents placed in lower extremity arteries  Plan: discussed in detail.  Increase walking/ activity as tolerated.  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 vasculitis (H)  Comment: chronic  Plan: continue low dose prednisone     (Z23) Need for prophylactic vaccination and inoculation against influenza  Comment: needs   Plan: FLU VACCINE, INCREASED ANTIGEN, PRESV FREE, AGE        65+ [01006], ADMIN INFLUENZA (For MEDICARE         Patients ONLY) []        Given     (E78.5) Hyperlipidemia LDL goal <100  Comment: refill med  Plan: simvastatin (ZOCOR) 40 MG tablet, Lipid panel         reflex to direct LDL Fasting, Comprehensive         metabolic panel        Check labs     (I10) Hypertension goal BP (blood pressure) < 140/90  Comment: blood pressure high  Plan: hydrochlorothiazide (HYDRODIURIL) 25 MG tablet,        atenolol (TENORMIN) 25 MG tablet        Continue same meds, monitor     (E87.6) Hypokalemia  Comment: refill   Plan: potassium chloride SA (K-DUR/KLOR-CON M) 20 MEQ        CR tablet             (R73.01) Impaired fasting glucose  Comment: check   Plan: Hemoglobin A1c             (R53.83) Fatigue, unspecified type  Comment: check   Plan: CBC with platelets differential, TSH with free         T4 reflex             (Z72.0) Tobacco abuse  Comment: strongly advised complete cessation   Plan: varenicline (CHANTIX) 1 MG tablet        Could try the chantix; prescription given.  Discussed in detail.       I reviewed the patient's medications, allergies,  medical history, family history, and social history.    Mitch Herrera MD

## 2017-10-31 NOTE — LETTER
[facility]  [address] [place]  [address]  [phone]        November 2, 2017    Mendoza Shaikh  [address] [facility] [address]        Dear Mendoza,    Your labs are all stable.       Results for orders placed or performed in visit on 10/31/17   Lipid panel reflex to direct LDL Fasting   Result Value Ref Range    Cholesterol 163 <200 mg/dL    Triglycerides 88 <150 mg/dL    HDL Cholesterol 58 >39 mg/dL    LDL Cholesterol Calculated 87 <100 mg/dL    Non HDL Cholesterol 105 <130 mg/dL   Comprehensive metabolic panel   Result Value Ref Range    Sodium 137 133 - 144 mmol/L    Potassium 3.7 3.4 - 5.3 mmol/L    Chloride 102 94 - 109 mmol/L    Carbon Dioxide 26 20 - 32 mmol/L    Anion Gap 9 3 - 14 mmol/L    Glucose 100 (H) 70 - 99 mg/dL    Urea Nitrogen 25 7 - 30 mg/dL    Creatinine 1.25 0.66 - 1.25 mg/dL    GFR Estimate 56 (L) >60 mL/min/1.7m2    GFR Estimate If Black 68 >60 mL/min/1.7m2    Calcium 9.6 8.5 - 10.1 mg/dL    Bilirubin Total 0.6 0.2 - 1.3 mg/dL    Albumin 3.5 3.4 - 5.0 g/dL    Protein Total 7.5 6.8 - 8.8 g/dL    Alkaline Phosphatase 76 40 - 150 U/L    ALT 24 0 - 70 U/L    AST 18 0 - 45 U/L   CBC with platelets differential   Result Value Ref Range    WBC 9.9 4.0 - 11.0 10e9/L    RBC Count 4.62 4.4 - 5.9 10e12/L    Hemoglobin 15.3 13.3 - 17.7 g/dL    Hematocrit 44.7 40.0 - 53.0 %    MCV 97 78 - 100 fl    MCH 33.1 (H) 26.5 - 33.0 pg    MCHC 34.2 31.5 - 36.5 g/dL    RDW 13.5 10.0 - 15.0 %    Platelet Count 169 150 - 450 10e9/L    Diff Method Automated Method     % Neutrophils 73.3 %    % Lymphocytes 12.2 %    % Monocytes 11.7 %    % Eosinophils 2.4 %    % Basophils 0.4 %    Absolute Neutrophil 7.3 1.6 - 8.3 10e9/L    Absolute Lymphocytes 1.2 0.8 - 5.3 10e9/L    Absolute Monocytes 1.2 0.0 - 1.3 10e9/L    Absolute Eosinophils 0.2 0.0 - 0.7 10e9/L    Absolute Basophils 0.0 0.0 - 0.2 10e9/L   Hemoglobin A1c   Result Value Ref Range    Hemoglobin A1C  5.9 4.3 - 6.0 %   TSH with free T4 reflex   Result Value Ref Range    TSH 1.70 0.40 - 4.00 mU/L       If you have any questions please call the clinic at [phone].    Sincerely,    Mitch Herrera MD  [facility]

## 2017-11-08 ENCOUNTER — TELEPHONE (OUTPATIENT)
Dept: FAMILY MEDICINE | Facility: CLINIC | Age: 77
End: 2017-11-08

## 2017-11-08 NOTE — TELEPHONE ENCOUNTER
Caller would like to discuss an/a Return call for requesting to see as New patient. Writer advised caller of callback within 24-72 hours.    Patient Name: Bri Reagan  Caller Name: Bryan Edilson, ([facility])  Name of Facility: n/a  Fax Number: n/a  Callback Number: [phone]  Additional Info: Bryan stated that [facility] is his and his family's PCP. They will be seeing Dr Vigil 3/9 to comfirm pregnancy and would like to make him her primary doctor also.       Thank you,  Joyce Rey   Forms received from: [employer]   Phone number listed: [phone]   Fax listed: [phone]  Date received: 11/08/2017  Form description: Cancer Registry follow up  Once forms are completed, please return to [employer] [facility] [employer] via mail.  Is patient requesting to be contacted when forms are completed: NA   Form placed: In provider's basket  Zaida Holland

## 2018-01-15 DIAGNOSIS — I10 HYPERTENSION GOAL BP (BLOOD PRESSURE) < 140/90: ICD-10-CM

## 2018-01-15 RX ORDER — ATENOLOL 25 MG/1
TABLET ORAL
Qty: 270 TABLET | Refills: 1 | Status: CANCELLED | OUTPATIENT
Start: 2018-01-15

## 2018-01-15 NOTE — TELEPHONE ENCOUNTER
Requested Prescriptions   Pending Prescriptions Disp Refills     atenolol (TENORMIN) 25 MG tablet 270 tablet 1     Sig: TAKE THREE TABLETS BY MOUTH ONCE DAILY    There is no refill protocol information for this order   Last Written Prescription Date:  10-31-17  Last Fill Quantity: 270,  # refills: 1   Last Office Visit with [facility]EN[facility], [facility] or [facility] prescribing provider:  10-31-17   Future Office Visit:

## 2018-01-15 NOTE — TELEPHONE ENCOUNTER
6 month supply [facility]  [facility], patient has actually filled this Rx with 50mg as they do not have the 25mg Atenolol on hand.  Does not need this filled at this time.    Fany Nguyễn RN  [facility]

## 2018-01-15 NOTE — TELEPHONE ENCOUNTER
"Requested Prescriptions   Pending Prescriptions Disp Refills     atenolol (TENORMIN) 25 MG tablet 270 tablet 1     Sig: TAKE THREE TABLETS BY MOUTH ONCE DAILY    Beta-Blockers Protocol Failed    1/15/2018  3:04 PM       Failed - Blood pressure under 140/90    BP Readings from Last 3 Encounters:   10/31/17 145/70   05/26/17 155/80   04/14/17 125/70                Passed - Patient is age 6 or older       Passed - Recent or future visit with authorizing provider's specialty    Patient had office visit in the last year or has a visit in the next 30 days with authorizing provider.  See \"Patient Info\" tab in inbasket, or \"Choose Columns\" in Meds & Orders section of the refill encounter.                 "

## 2018-01-23 DIAGNOSIS — I10 HTN (HYPERTENSION): ICD-10-CM

## 2018-01-23 RX ORDER — AMLODIPINE BESYLATE 10 MG/1
TABLET ORAL
Qty: 90 TABLET | Refills: 3 | Status: SHIPPED | OUTPATIENT
Start: 2018-01-23

## 2018-01-23 NOTE — TELEPHONE ENCOUNTER
"Requested Prescriptions   Pending Prescriptions Disp Refills     amLODIPine (NORVASC) 10 MG tablet [Pharmacy Med Name: AMLODIPINE BESYLATE 10 MG TAB] 90 tablet 3    Last Written Prescription Date:  4-24-17  Last Fill Quantity: 90,  # refills: 3   Last Office Visit with [facility], P or [facility] prescribing provider:  10-31-17   Future Office Visit:      Sig: TAKE 1 TABLET (10 MG) BY MOUTH DAILY    Calcium Channel Blockers Protocol  Failed    1/23/2018  1:22 PM       Failed - Blood pressure under 140/90    BP Readings from Last 3 Encounters:   10/31/17 145/70   05/26/17 155/80   04/14/17 125/70                Passed - Recent or future visit with authorizing provider    Patient had office visit in the last year or has a visit in the next 30 days with authorizing provider.  See \"Patient Info\" tab in inbasket, or \"Choose Columns\" in Meds & Orders section of the refill encounter.            Passed - Patient is age 18 or older       Passed - Normal serum creatinine on file in past 12 months    Recent Labs   Lab Test  10/31/17   1025   CR  1.25               "

## 2018-01-23 NOTE — TELEPHONE ENCOUNTER
BP Readings from Last 6 Encounters:   10/31/17 145/70   05/26/17 155/80   04/14/17 125/70   12/29/16 168/74   08/16/16 136/70   07/14/16 150/68

## 2018-02-01 ENCOUNTER — TELEPHONE (OUTPATIENT)
Dept: FAMILY MEDICINE | Facility: CLINIC | Age: 78
End: 2018-02-01

## 2018-02-01 NOTE — TELEPHONE ENCOUNTER
Reason for Call:  Other call back    Detailed comments: Patient called to give the name of his heart doctor he saw awhile ago, but he can only remember the first name ( Natasha )    Phone Number Patient can be reached at: Home number on file [phone] (home)    Best Time: anytime    Can we leave a detailed message on this number? YES    Call taken on 2/1/2018 at 3:50 PM by Kaylee Gonzalez

## 2018-02-01 NOTE — TELEPHONE ENCOUNTER
Attempt # 1  Called patient at home number.  Was call answered? No, left message to call nurse line   Staci Joel RN  [facility]

## 2018-02-01 NOTE — TELEPHONE ENCOUNTER
This TE was supposed to be under Marni griggs  6/7/1937    Was opened in error    Staci Joel RN  [facility]

## 2018-03-16 ENCOUNTER — OFFICE VISIT (OUTPATIENT)
Dept: PODIATRY | Facility: CLINIC | Age: 78
End: 2018-03-16
Payer: MEDICARE

## 2018-03-16 VITALS — BODY MASS INDEX: 25.27 KG/M2 | OXYGEN SATURATION: 98 % | HEIGHT: 67 IN | WEIGHT: 161 LBS | HEART RATE: 59 BPM

## 2018-03-16 DIAGNOSIS — L84 CORNS AND CALLOSITIES: Primary | ICD-10-CM

## 2018-03-16 PROCEDURE — 11056 PARNG/CUTG B9 HYPRKR LES 2-4: CPT | Performed by: PODIATRIST

## 2018-03-16 NOTE — PROGRESS NOTES
S:  Patient here for routine care and signed the ABN before seeing us today.    O:   There is a callus on left sub 1/5 met heads    A:  Callus X 2.    P:  ABN signed.  Calluses debrided.   RETURN TO CLINIC prn.      DIALLO CLEMENTS DPM, FACFAS

## 2018-03-16 NOTE — PATIENT INSTRUCTIONS
We wish you continued good healing. If you have any questions or concerns, please do not hesitate to contact us at [phone]    Please remember to call and schedule a follow up appointment if one was recommended at your earliest convenience.   PODIATRY CLINIC HOURS  TELEPHONE NUMBER    Dr. Gustavo Martínez D.P.M [facility] FAS    Clinics:  [facility]    Mireya Martin [facility]   Tuesday 1PM-6PM  [place]  Wednesday 7AM-2PM  [place]  Thursday 10AM-6PM  [place]  Friday 7AM-3PM  [place]  Specialty schedulers:   [phone] to make an appointment with any Specialty Provider.        Urgent Care locations:    [facility] Monday-Friday 5 pm - 9 pm. Saturday-Sunday 9 am -5pm    Monday-Friday 11 am - 9 pm Saturday 9 am - 5 pm     Monday-Sunday 12 noon-8PM [phone]     If you need a medication refill, please contact us you may need lab work and/or a follow up visit prior to your refill (i.e. Antifungal medications).    [employer] (secure e-mail communication and access to your chart) to send a message or to make an appointment.    If MRI needed please call Jeet Izaguirre at [phone]        Weight management plan: Patient was referred to their PCP to discuss a diet and exercise plan.   SMOKING CESSATION  What's in cigarette smoke? - Cigarette smoke contains over 4,000 chemicals. Nicotine is one of the main ingredients which is an insecticide/herbicide. It is poisonous to our nervous system, increases blood clotting risk, and decreases the body's defenses to fight off infection. Another chemical is Carbon Monoxide is an asphyxiating gas that permanently binds to blood cells and blocks the transport of oxygen. This leads to tissue death and decreases your metabolism. Tar is a chemical that coats your lungs and trachea which impairs new oxygen coming in and carbon dioxide getting out  of your body.   How does smoking impact surgery? - Smoking is particularly hazardous with regards to surgery. Surgery puts stress on the body and a smoker's body is already under strain from these chemicals. Putting the two together, especially for an elective surgery, could be a recipe for disaster. Smoking before and after surgery increases your risk of heart problems, slow wound healing, delayed bone healing, blood clots, wound infection and anesthesia complications.   What are the benefits of quitting? - In 20 minutes your blood pressure will drop back down to normal. In 8 hours the carbon monoxide (a toxic gas) levels in your blood stream will drop by half, and oxygen levels will return to normal. In 48 hours your chance of having a heart attack will have decreased. All nicotine will have left your body. Your sense of taste and smell will return to a normal level. In 72 hours your bronchial tubes will relax, and your energy levels will increase. In 2 weeks your circulation will increase, and it will continue to improve for the next 10 weeks.    Recommendations for elective surgery - Ideally, patients should quit smoking 8 weeks before and at least 2 weeks after elective surgery in order to avoid complications. Simply cutting back on the amount of cigarettes smoked per day does not offer any benefit or decrease the risk of poor wound healing, heart problems, and infection. Smokers should also start taking Vitamin C and B for two weeks before surgery and two weeks after surgery.    Ways to Stop Smokin. Nicotine patches, lozenges, or gum  2. Support Groups  3. Medications (see below)    List of Medications:  1. Varenicline Tartrate (CHANTIX)   2. Bupropion HCL (WELLBUTRIN, ZYBAN)   note: make sure Wellbutrin is for smoking cessation and not other issues   3. Nicotine Patch (NICODERM)   4. Nicotine Inhaler (NICOTROL)   5. Nicotine Gum Nicotine Polacrilex   6. Nicotine Lozenge: Nicotine Polacrilex (COMMIT)    * [employer] offers a smoking support group as well!  Please visit: [email or web address]  If you are interested in these, ask about getting a prescription or talk to your primary care doctor about what may be the best way for you to quit.

## 2018-03-16 NOTE — MR AVS SNAPSHOT
After Visit Summary   3/16/2018    Mendoza Shaikh    MRN: [id number]           Patient Information     Date Of Birth          1940        Visit Information        Provider Department      3/16/2018 1:30 PM Gustavo Martínez, XIMENA [facility]        Care Instructions    We wish you continued good healing. If you have any questions or concerns, please do not hesitate to contact us at [phone]    Please remember to call and schedule a follow up appointment if one was recommended at your earliest convenience.   PODIATRY CLINIC HOURS  TELEPHONE NUMBER    Dr. Gustavo Mart[facility]nez [facility]    Clinics:  [facility]    [facility]   Tuesday 1PM-6PM  [facility]  Wednesday 7AM-2PM  [facility]  Thursday 10AM-6PM  [place]  Friday 7AM-3PM  [place]  Specialty schedulers:   [phone] to make an appointment with any Specialty Provider.        Urgent Care locations:    [facility] Monday-Friday 5 pm - 9 pm. Saturday-Sunday 9 am -5pm    Monday-Friday 11 am - 9 pm Saturday 9 am - 5 pm     Monday-Sunday 12 noon-8PM [phone]     If you need a medication refill, please contact us you may need lab work and/or a follow up visit prior to your refill (i.e. Antifungal medications).    MyChart (secure e-mail communication and access to your chart) to send a message or to make an appointment.    If MRI needed please call Jeet Izaguirre at [phone]        Weight management plan: Patient was referred to their PCP to discuss a diet and exercise plan.   SMOKING CESSATION  What's in cigarette smoke? - Cigarette smoke contains over 4,000 chemicals. Nicotine is one of the main ingredients which is an insecticide/herbicide. It is poisonous to our nervous system, increases blood clotting risk, and decreases the body's defenses to fight off  infection. Another chemical is Carbon Monoxide is an asphyxiating gas that permanently binds to blood cells and blocks the transport of oxygen. This leads to tissue death and decreases your metabolism. Tar is a chemical that coats your lungs and trachea which impairs new oxygen coming in and carbon dioxide getting out of your body.   How does smoking impact surgery? - Smoking is particularly hazardous with regards to surgery. Surgery puts stress on the body and a smoker's body is already under strain from these chemicals. Putting the two together, especially for an elective surgery, could be a recipe for disaster. Smoking before and after surgery increases your risk of heart problems, slow wound healing, delayed bone healing, blood clots, wound infection and anesthesia complications.   What are the benefits of quitting? - In 20 minutes your blood pressure will drop back down to normal. In 8 hours the carbon monoxide (a toxic gas) levels in your blood stream will drop by half, and oxygen levels will return to normal. In 48 hours your chance of having a heart attack will have decreased. All nicotine will have left your body. Your sense of taste and smell will return to a normal level. In 72 hours your bronchial tubes will relax, and your energy levels will increase. In 2 weeks your circulation will increase, and it will continue to improve for the next 10 weeks.    Recommendations for elective surgery - Ideally, patients should quit smoking 8 weeks before and at least 2 weeks after elective surgery in order to avoid complications. Simply cutting back on the amount of cigarettes smoked per day does not offer any benefit or decrease the risk of poor wound healing, heart problems, and infection. Smokers should also start taking Vitamin C and B for two weeks before surgery and two weeks after surgery.    Ways to Stop Smokin. Nicotine patches, lozenges, or gum  2. Support Groups  3. Medications (see below)    List  "of Medications:  1. Varenicline Tartrate (CHANTIX)   2. Bupropion HCL (WELLBUTRIN, ZYBAN) - note: make sure Wellbutrin is for smoking cessation and not other issues   3. Nicotine Patch (NICODERM)   4. Nicotine Inhaler (NICOTROL)   5. Nicotine Gum Nicotine Polacrilex   6. Nicotine Lozenge: Nicotine Polacrilex (COMMIT)   * [place] offers a smoking support group as well!  Please visit: [email or web address]  If you are interested in these, ask about getting a prescription or talk to your primary care doctor about what may be the best way for you to quit.                   Follow-ups after your visit        Your next 10 appointments already scheduled     Mar 16, 2018  1:30 PM CDT   Return Visit with Gustavo Martínez DPM   [facility] ([facility])    [address] [place] [id number] [phone]              Who to contact     If you have questions or need follow up information about today's clinic visit or your schedule please contact [facility] directly at [phone].  Normal or non-critical lab and imaging results will be communicated to you by MyChart, letter or phone within 4 business days after the clinic has received the results. If you do not hear from us within 7 days, please contact the clinic through Collective Digital Studiohart or phone. If you have a critical or abnormal lab result, we will notify you by phone as soon as possible.  Submit refill requests through Beatsy or call your pharmacy and they will forward the refill request to us. Please allow 3 business days for your refill to be completed.          Additional Information About Your Visit        MyChart Information     Beatsy lets you send messages to your doctor, view your test results, renew your prescriptions, schedule appointments and more. To sign up, go to [email or web address] . Click on \"Log in\" on the left side of the screen, which will take " "you to the Welcome page. Then click on \"Sign up Now\" on the right side of the page.     You will be asked to enter the access code listed below, as well as some personal information. Please follow the directions to create your username and password.     Your access code is: [email or web address]  Expires: 2018  1:26 PM     Your access code will  in 90 days. If you need help or a new code, please call your [place] clinic or [phone].        Care EveryWhere ID     This is your Care EveryWhere ID. This could be used by other organizations to access your [place] medical records  [id number]        Your Vitals Were     Pulse Height Pulse Oximetry BMI (Body Mass Index)          59 5' 6.53\" (1.69 m) 98% 25.57 kg/m2         Blood Pressure from Last 3 Encounters:   10/31/17 145/70   17 155/80   17 125/70    Weight from Last 3 Encounters:   18 161 lb (73 kg)   10/31/17 161 lb 4 oz (73.1 kg)   10/12/17 163 lb (73.9 kg)              Today, you had the following     No orders found for display       Primary Care Provider Office Phone # Fax #    Mitch Herrera [phone]       [address] [facility] [address]        Equal Access to Services     NITHYA DOE AH: Hadii shaheen velazco hadladio Soanand, waaxda luqadaha, qaybta kaalmada dinora, sigifredo monge . So [facility] [place] [facility] [phone].    ATENCIÓN: Si habla español, tiene a gonsales disposición servicios gratuitos de asistencia lingüística. Meena al [phone].    We comply with applicable federal civil rights laws and [place] laws. We do not discriminate on the basis of race, color, national origin, age, disability, sex, sexual orientation, or gender identity.            Thank you!     Thank you for choosing [facility]  for your care. Our goal is always to provide you with excellent care. Hearing back from our patients is one way we can continue to improve our services. Please take a few " minutes to complete the written survey that you may receive in the mail after your visit with us. Thank you!             Your Updated Medication List - Protect others around you: Learn how to safely use, store and throw away your medicines at www.disposemymeds.org.          This list is accurate as of 3/16/18  1:26 PM.  Always use your most recent med list.                   Brand Name Dispense Instructions for use Diagnosis    amLODIPine 10 MG tablet    NORVASC    90 tablet    TAKE 1 TABLET (10 MG) BY MOUTH DAILY    HTN (hypertension)       aspirin 81 MG tablet      Take 1 tablet by mouth daily.        atenolol 25 MG tablet    TENORMIN    270 tablet    TAKE THREE TABLETS BY MOUTH ONCE DAILY    Hypertension goal BP (blood pressure) < 140/90       clopidogrel 75 MG tablet    PLAVIX     Take 75 mg by mouth daily        hydrochlorothiazide 25 MG tablet    HYDRODIURIL    90 tablet    TAKE 1 TABLET (25 MG) BY MOUTH DAILY    Hypertension goal BP (blood pressure) < 140/90       potassium chloride SA 20 MEQ CR tablet    K-DUR/KLOR-CON M    90 tablet    Take 1 tablet (20 mEq) by mouth daily    Hypokalemia       predniSONE 2.5 MG tablet    DELTASONE    180 tablet    TAKE 2 TABS BY MOUTH ONCE DAILY    Leukocytoclastic vasculitis (H)       simvastatin 40 MG tablet    ZOCOR    90 tablet    TAKE 1 TABLET (40 MG) BY MOUTH AT BEDTIME    Hyperlipidemia LDL goal <100       varenicline 1 MG tablet    CHANTIX    60 tablet    Take 1 tablet (1 mg) by mouth 2 times daily    Tobacco abuse       vitamin D 1000 UNITS capsule      Take 2 capsules by mouth daily.

## 2018-03-16 NOTE — LETTER
3/16/2018         RE: Mendoza Shaikh  [address] [facility] [address]        Dear Colleague,    Thank you for referring your patient, Mendoza Shaikh, to the [facility]. Please see a copy of my visit note below.    S:  Patient here for routine care and signed the ABN before seeing us today.    O:   There is a callus on left sub 1/5 met heads    A:  Callus X 2.    P:  ABN signed.  Calluses debrided.   RETURN TO CLINIC prn.      DIALLO MARTÍNEZ DPM, FACFAS          Again, thank you for allowing me to participate in the care of your patient.        Sincerely,        Diallo Martíenz DPM

## 2018-04-05 ENCOUNTER — TELEPHONE (OUTPATIENT)
Dept: FAMILY MEDICINE | Facility: CLINIC | Age: 78
End: 2018-04-05

## 2018-04-05 NOTE — TELEPHONE ENCOUNTER
Forms received from: [employer]   Phone number listed: [phone]   Fax listed: [phone]  Date received: 4-5-18  Form description: lubricating jelly  Once forms are completed, please return to Art via fax.  Is patient requesting to be contacted when forms are completed: n/a  Form placed: provider desk  Yanira Barragan

## 2018-05-02 ENCOUNTER — OFFICE VISIT (OUTPATIENT)
Dept: FAMILY MEDICINE | Facility: CLINIC | Age: 78
End: 2018-05-02
Payer: MEDICARE

## 2018-05-02 VITALS
HEART RATE: 66 BPM | SYSTOLIC BLOOD PRESSURE: 139 MMHG | BODY MASS INDEX: 25.25 KG/M2 | WEIGHT: 159 LBS | TEMPERATURE: 97 F | DIASTOLIC BLOOD PRESSURE: 77 MMHG

## 2018-05-02 DIAGNOSIS — Z72.0 TOBACCO ABUSE: ICD-10-CM

## 2018-05-02 DIAGNOSIS — I65.23 BILATERAL CAROTID ARTERY STENOSIS: ICD-10-CM

## 2018-05-02 DIAGNOSIS — I10 HYPERTENSION GOAL BP (BLOOD PRESSURE) < 140/90: ICD-10-CM

## 2018-05-02 DIAGNOSIS — E78.5 HYPERLIPIDEMIA LDL GOAL <100: ICD-10-CM

## 2018-05-02 DIAGNOSIS — K43.9 ABDOMINAL WALL HERNIA: ICD-10-CM

## 2018-05-02 DIAGNOSIS — M31.0 LEUKOCYTOCLASTIC VASCULITIS (H): ICD-10-CM

## 2018-05-02 DIAGNOSIS — E87.6 HYPOKALEMIA: ICD-10-CM

## 2018-05-02 DIAGNOSIS — I73.9 PERIPHERAL ARTERIAL DISEASE (H): ICD-10-CM

## 2018-05-02 DIAGNOSIS — Z23 NEED FOR TDAP VACCINATION: ICD-10-CM

## 2018-05-02 DIAGNOSIS — R06.09 DYSPNEA ON EXERTION: Primary | ICD-10-CM

## 2018-05-02 PROCEDURE — 90471 IMMUNIZATION ADMIN: CPT | Performed by: FAMILY MEDICINE

## 2018-05-02 PROCEDURE — 90715 TDAP VACCINE 7 YRS/> IM: CPT | Performed by: FAMILY MEDICINE

## 2018-05-02 PROCEDURE — 99214 OFFICE O/P EST MOD 30 MIN: CPT | Mod: 25 | Performed by: FAMILY MEDICINE

## 2018-05-02 RX ORDER — ATENOLOL 25 MG/1
TABLET ORAL
Qty: 270 TABLET | Refills: 1 | Status: SHIPPED | OUTPATIENT
Start: 2018-05-02 | End: 2018-10-05

## 2018-05-02 RX ORDER — PREDNISONE 2.5 MG/1
TABLET ORAL
Qty: 180 TABLET | Refills: 0 | Status: SHIPPED | OUTPATIENT
Start: 2018-05-02 | End: 2018-05-11

## 2018-05-02 RX ORDER — SIMVASTATIN 40 MG
TABLET ORAL
Qty: 90 TABLET | Refills: 1 | Status: SHIPPED | OUTPATIENT
Start: 2018-05-02

## 2018-05-02 RX ORDER — POTASSIUM CHLORIDE 1500 MG/1
20 TABLET, EXTENDED RELEASE ORAL DAILY
Qty: 90 TABLET | Refills: 1 | Status: SHIPPED | OUTPATIENT
Start: 2018-05-02 | End: 2018-11-21

## 2018-05-02 RX ORDER — HYDROCHLOROTHIAZIDE 25 MG/1
TABLET ORAL
Qty: 90 TABLET | Refills: 1 | Status: SHIPPED | OUTPATIENT
Start: 2018-05-02

## 2018-05-02 ASSESSMENT — PAIN SCALES - GENERAL: PAINLEVEL: NO PAIN (0)

## 2018-05-02 NOTE — PROGRESS NOTES
SUBJECTIVE:   Mendoza Shaikh is a 78 year old male who presents to clinic today for the following health issues:       Back pain and problems for the past few weeks    none    Problem list and histories reviewed & adjusted, as indicated.  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 No distress.   HENT:   Head: Normocephalic and atraumatic.   Eyes: Conjunctivae are normal.   Neck: Carotid bruit is not present.   Cardiovascular: Normal rate, regular rhythm, normal heart sounds and intact distal pulses.  Exam reveals no gallop and no friction rub.    No murmur heard.  Pulmonary/Chest: Effort normal and breath sounds normal. No respiratory distress. He has no wheezes. He has no rales.   Musculoskeletal: He exhibits no edema.   Neurological: He is alert and oriented to person, place, and time.   Skin: He is not diaphoretic.   Psychiatric: Mood and affect normal.   no tenderness over spine or elsewhere over the back  Sensation and strength are normal in both lower extremities.  Negative straight leg raising test bilaterally.     Difficult to feel dorsalis pedis bilat but good color in both feet. Sensation and strength okay  Patient has small abd wall hernia just right of umbilicus; mildly tender  Correction to above, bruit on right carotid area     ASSESSMENT / PLAN:  (R06.09) Dyspnea on  exertion  (primary encounter diagnosis)  Comment: before embarking on viagra type med or elective surgery prudent to make sure hear okay.    Plan: NM Lexiscan stress test             (I73.9) Peripheral arterial disease (H)  Comment: known pad, s/p stents.  Plan: VASCULAR SURGERY REFERRAL        Follow up with vascular, mainly for the carotids     (M31.0) Leukocytoclastic vasculitis (H)  Comment: stable on the low dose prednisone   Plan: predniSONE (DELTASONE) 2.5 MG tablet             (Z23) Need for Tdap vaccination  Comment: needs   Plan: VACCINE ADMINISTRATION, INITIAL, TDAP VACCINE         (ADACEL), DISCONTINUED: Tdap,         tetanus-diphtheria-acell pertussis, (ADACEL)         5-2-15.5 LF-MCG/0.5 injection        Given via pharmacy    (I10) Hypertension goal BP (blood pressure) < 140/90  Comment: at goal barely   Plan: atenolol (TENORMIN) 25 MG tablet,         hydrochlorothiazide (HYDRODIURIL) 25 MG tablet             (E87.6) Hypokalemia  Comment: refill   Plan: potassium chloride SA (K-DUR/KLOR-CON M) 20 MEQ        CR tablet             (E78.5) Hyperlipidemia LDL goal <100  Comment: refill med;   Plan: simvastatin (ZOCOR) 40 MG tablet        Labs checked in Oct    (I65.23) Bilateral carotid artery stenosis  Comment: to see specialist  Plan: VASCULAR SURGERY REFERRAL             (Z72.0) Tobacco abuse  Comment: discussed again   Plan: advise complete cessation    abd wall hernia: discussed in detail.  If stress test okay, consider gen surg consult        I reviewed the patient's medications, allergies, medical history, family history, and social history.    Mitch Herrera MD

## 2018-05-02 NOTE — MR AVS SNAPSHOT
"              After Visit Summary   5/2/2018    Mendoza Shaikh    MRN: [id number]           Patient Information     Date Of Birth          1940        Visit Information        Provider Department      5/2/2018 1:40 PM Mitch Herrera MD [facility]        Today's Diagnoses     Need for Tdap vaccination    -  1    Dyspnea on exertion        Hypertension goal BP (blood pressure) < 140/90        Hypokalemia        Leukocytoclastic vasculitis (H)        Hyperlipidemia LDL goal <100          Care Instructions    Stay on same meds     tdap shot today    Stress test to be scheduled            Follow-ups after your visit        Future tests that were ordered for you today     Open Future Orders        Priority Expected Expires Ordered    NM Lexiscan stress test Routine  5/2/2019 5/2/2018            Who to contact     If you have questions or need follow up information about today's clinic visit or your schedule please contact [facility] directly at [phone].  Normal or non-critical lab and imaging results will be communicated to you by MyChart, letter or phone within 4 business days after the clinic has received the results. If you do not hear from us within 7 days, please contact the clinic through StartupMojohart or phone. If you have a critical or abnormal lab result, we will notify you by phone as soon as possible.  Submit refill requests through PureForge or call your pharmacy and they will forward the refill request to us. Please allow 3 business days for your refill to be completed.          Additional Information About Your Visit        MyChart Information     P[employer]Forge lets you send messages to your doctor, view your test results, renew your prescriptions, schedule appointments and more. To sign up, go to [email or web address] . Click on \"Log in\" on the left side of the screen, which will take you to the Welcome page. Then click on \"Sign up Now\" on the right side " of the page.     You will be asked to enter the access code listed below, as well as some personal information. Please follow the directions to create your username and password.     Your access code is: [email or web address]  Expires: 2018  1:26 PM     Your access code will  in 90 days. If you need help or a new code, please call your [facility] or [phone].        Care EveryWhere ID     This is your Care EveryWhere ID. This could be used by other organizations to access your [place] medical records  [id number]        Your Vitals Were     Pulse Temperature BMI (Body Mass Index)             66 97  F (36.1  C) (Oral) 25.25 kg/m2          Blood Pressure from Last 3 Encounters:   18 139/77   10/31/17 145/70   17 155/80    Weight from Last 3 Encounters:   18 159 lb (72.1 kg)   18 161 lb (73 kg)   10/31/17 161 lb 4 oz (73.1 kg)              We Performed the Following     VACCINE ADMINISTRATION, INITIAL          Today's Medication Changes          These changes are accurate as of 18  2:14 PM.  If you have any questions, ask your nurse or doctor.               Start taking these medicines.        Dose/Directions    Tdap (tetanus-diphtheria-acell pertussis) 5-2-15.5 LF-MCG/0.5 injection   Commonly known as:  ADACEL   Used for:  Need for Tdap vaccination   Started by:  Mitch Herrera MD        Dose:  0.5 mL   Inject 0.5 mLs into the muscle once for 1 dose   Quantity:  0.5 mL   Refills:  0         These medicines have changed or have updated prescriptions.        Dose/Directions    predniSONE 2.5 MG tablet   Commonly known as:  DELTASONE   This may have changed:  See the new instructions.   Used for:  Leukocytoclastic vasculitis (H)   Changed by:  Mitch Herrera MD        TAKE 2 TABS BY MOUTH ONCE DAILY   Quantity:  180 tablet   Refills:  0            Where to get your medicines      These medications were sent to [place] [address] [place] [facility] [address]  [address]  [place] [address]     Phone:  [phone]     atenolol 25 MG tablet    hydrochlorothiazide 25 MG tablet    potassium chloride SA 20 MEQ CR tablet    predniSONE 2.5 MG tablet    simvastatin 40 MG tablet         Some of these will need a paper prescription and others can be bought over the counter.  Ask your nurse if you have questions.     Bring a paper prescription for each of these medications     Tdap (tetanus-diphtheria-acell pertussis) 5-2-15.5 LF-MCG/0.5 injection                Primary Care Provider Office Phone # Fax #    Mitch Herrera [phone]       [address] [facility] [address]        Equal Access to Services     [facility] Developmental CenterFREDDY : Hadii shaheen Salas, waaxdebora smyth, qaybkareem kaalmada dinora, sigifredo monge . So [facility] [phone].    ATENCIÓN: Si habla español, tiene a gonsales disposición servicios gratuitos de asistencia lingüística. Llame al [phone].    We comply with applicable federal civil rights laws and [place] laws. We do not discriminate on the basis of race, color, national origin, age, disability, sex, sexual orientation, or gender identity.            Thank you!     Thank you for choosing [facility]  for your care. Our goal is always to provide you with excellent care. Hearing back from our patients is one way we can continue to improve our services. Please take a few minutes to complete the written survey that you may receive in the mail after your visit with us. Thank you!             Your Updated Medication List - Protect others around you: Learn how to safely use, store and throw away your medicines at www.disposemymeds.org.          This list is accurate as of 5/2/18  2:14 PM.  Always use your most recent med list.                   Brand Name Dispense Instructions for use Diagnosis    amLODIPine 10 MG tablet    NORVASC    90 tablet    TAKE 1 TABLET (10 MG) BY MOUTH DAILY    HTN  (hypertension)       aspirin 81 MG tablet      Take 1 tablet by mouth daily.        atenolol 25 MG tablet    TENORMIN    270 tablet    TAKE THREE TABLETS BY MOUTH ONCE DAILY    Hypertension goal BP (blood pressure) < 140/90       clopidogrel 75 MG tablet    PLAVIX     Take 75 mg by mouth daily        hydrochlorothiazide 25 MG tablet    HYDRODIURIL    90 tablet    TAKE 1 TABLET (25 MG) BY MOUTH DAILY    Hypertension goal BP (blood pressure) < 140/90       potassium chloride SA 20 MEQ CR tablet    K-DUR/KLOR-CON M    90 tablet    Take 1 tablet (20 mEq) by mouth daily    Hypokalemia       predniSONE 2.5 MG tablet    DELTASONE    180 tablet    TAKE 2 TABS BY MOUTH ONCE DAILY    Leukocytoclastic vasculitis (H)       * simvastatin 40 MG tablet    ZOCOR    90 tablet    TAKE 1 TABLET (40 MG) BY MOUTH AT BEDTIME    Hyperlipidemia LDL goal <100       * simvastatin 40 MG tablet    ZOCOR    90 tablet    TAKE 1 TABLET (40 MG) BY MOUTH AT BEDTIME    Hyperlipidemia LDL goal <100       Tdap (tetanus-diphtheria-acell pertussis) 5-2-15.5 LF-MCG/0.5 injection    ADACEL    0.5 mL    Inject 0.5 mLs into the muscle once for 1 dose    Need for Tdap vaccination       varenicline 1 MG tablet    CHANTIX    60 tablet    Take 1 tablet (1 mg) by mouth 2 times daily    Tobacco abuse       vitamin D 1000 units capsule      Take 2 capsules by mouth daily.        * Notice:  This list has 2 medication(s) that are the same as other medications prescribed for you. Read the directions carefully, and ask your doctor or other care provider to review them with you.

## 2018-05-03 ENCOUNTER — TELEPHONE (OUTPATIENT)
Dept: OTHER | Facility: CLINIC | Age: 78
End: 2018-05-03

## 2018-05-03 DIAGNOSIS — I65.29 CAROTID STENOSIS: Primary | ICD-10-CM

## 2018-05-03 NOTE — TELEPHONE ENCOUNTER
"Pt referred to [facility] by Dr. Herrera for Asymptomatic bilateral carotid stenosis and peripheral artery disease, history of stents.     5-2-18 Dr. Herrera notes:  \"Difficult to feel dorsalis pedis bilat but good color in both feet. Sensation and strength okay\"  \"bruit on right carotid area\"    Pt hx:  5-2-16 carotid bilateral u/s   \"Impression:  1. Right carotid: High-grade internal carotid stenosis approaching occlusion. Velocity elevations have progressed since the prior study.  2. Left carotid: Chronic occlusion of the internal carotid artery. Significant external carotid stenosis.  3. Vertebral arteries: Patent and antegrade bilaterally.   [facility]    5-25-16 MR angio Neck:  \"Impression:   1. Severe stenosis at the origin of the right ICA, estimated at 70 to 80% as detailed above.  2. Moderate stenosis noted at the origin of the right external carotid artery.  3. Right carotid siphon is patent with multifocal irregularity consistent with atherosclerotic change in a diffuse fashion without high-grade stenosis.  4. The left ICA is chronically completely occluded with reconstitution at the left ICA terminus. See complete description above.  [facility]    10-25-17 bilateral JENNY  \"Impression:   1. On the right there is evidence for a severe short segment focal stenosis in the mid SFA, new since prior, a new short segment occlusion of the popliteal artery proximally with reconstitution of the mid and distal popliteal artery distally via collaterals, presumably, as well as chronic posterior tibial artery occlusion. JENNY is 0.64, previously it measured 0.61.  2. On the left there is evidence for occlusion of the proximal, mid and distal SFA, including the stent in the SFA, with the very proximal portion of the SFA patent as well as the very distal portion of the SFA being patent. The popliteal artery is patent, also. The distal SFA and popliteal artery are likely " "reconstituted from collaterals from the profunda. This SFA occlusion is new since the prior. There is also new dorsalis pedis artery occlusion. JENNY is 0.39, previously it measured 1.0.  [facility].\"    10-28-17 LE angiogram:  \"POSTPROCEDURE DIAGNOSIS:  Left foot rest pain.    PROCEDURE PERFORMED TODAY:  1.  Left leg thrombolysis using Power Pulse technique.  2.  Left superficial femoral artery angioplasty and stent placement.  3.  Left popliteal artery drug-eluting balloon angioplasty.  4.  Left distal popliteal and peroneal drug-eluting stent placement.  5.  Left proximal superficial femoral artery stent graft placement.    Call placed to Dr. Herrera for further information, pt has been treating with [facility].     ELIZABETH SmithN, RN  "

## 2018-05-04 NOTE — TELEPHONE ENCOUNTER
Pt has seen Dr. Diehl with [facility] in the past.     Discussed with Dr. Banks who recommends CTA head/neck with OV. Any further imaging will be ordered post OV.     Pt needs to be scheduled for CTA head/neck and consult with vascular surgery.  Will route to scheduling to coordinate an appointment at next available.    Atiya Hager, ELIZABETHN, RN

## 2018-05-04 NOTE — TELEPHONE ENCOUNTER
Left message on home number for patient to call back to schedule CTA & consult appointment with Vascular Surgery.

## 2018-05-07 NOTE — TELEPHONE ENCOUNTER
Dr Herrera wrote back in staff message:   He will probably go to [facility] again   Thanks   Mitch Monsivais, RN, BSN

## 2018-09-28 ENCOUNTER — TELEPHONE (OUTPATIENT)
Dept: FAMILY MEDICINE | Facility: CLINIC | Age: 78
End: 2018-09-28

## 2018-09-28 NOTE — TELEPHONE ENCOUNTER
Routing to PCP to review and advise.    Patient requesting new prescription.   Last filled 11/24/2010 is history    Staci Joel RN  [facility]

## 2018-09-28 NOTE — TELEPHONE ENCOUNTER
Patient called and stated that he hurt his back and would like a prescription for ibuprofen 800mg. Pharmacy  [facility] [id number] [place] [facility] - [place], [place] - [address] I did inform him Dr Herrera was out of clinic . Please advise [phone].  Mell Lazar, TC

## 2018-09-28 NOTE — TELEPHONE ENCOUNTER
Attempt # 1  Called patient at home number.[phone]  Was call answered?  Yes, is care giver - patient fell and when this patient picked her up he hurt his back, lower back, constant pain, worse when lays down, has been taking left over Ibuprofen 800 mg 1 tab every 8 hours for last 4 days. States is a little better today.   Scheduled appointment with PCP for 10/2      Staci Joel RN  [facility]

## 2018-10-02 ENCOUNTER — OFFICE VISIT (OUTPATIENT)
Dept: FAMILY MEDICINE | Facility: CLINIC | Age: 78
End: 2018-10-02
Payer: MEDICARE

## 2018-10-02 VITALS
DIASTOLIC BLOOD PRESSURE: 72 MMHG | SYSTOLIC BLOOD PRESSURE: 147 MMHG | WEIGHT: 154.38 LBS | OXYGEN SATURATION: 98 % | HEART RATE: 70 BPM | TEMPERATURE: 96.8 F | BODY MASS INDEX: 24.52 KG/M2

## 2018-10-02 DIAGNOSIS — S39.012A STRAIN OF LUMBAR REGION, INITIAL ENCOUNTER: Primary | ICD-10-CM

## 2018-10-02 DIAGNOSIS — M79.10 MUSCLE PAIN: ICD-10-CM

## 2018-10-02 DIAGNOSIS — K59.00 CONSTIPATION, UNSPECIFIED CONSTIPATION TYPE: ICD-10-CM

## 2018-10-02 PROCEDURE — 99214 OFFICE O/P EST MOD 30 MIN: CPT | Performed by: FAMILY MEDICINE

## 2018-10-02 RX ORDER — POLYETHYLENE GLYCOL 3350 17 G/17G
1 POWDER, FOR SOLUTION ORAL 2 TIMES DAILY PRN
Qty: 225 G | Refills: 1 | Status: SHIPPED | OUTPATIENT
Start: 2018-10-02

## 2018-10-02 RX ORDER — TIZANIDINE 2 MG/1
2 TABLET ORAL 3 TIMES DAILY PRN
Qty: 20 TABLET | Refills: 1 | Status: SHIPPED | OUTPATIENT
Start: 2018-10-02

## 2018-10-02 ASSESSMENT — PAIN SCALES - GENERAL: PAINLEVEL: MODERATE PAIN (5)

## 2018-10-02 NOTE — PROGRESS NOTES
SUBJECTIVE:   Mendoza Shaikh is a 78 year old male who presents to clinic today for the following health issues:       Back injury about 5 days ago trying to help his roommate get up after she fell. He is unsure if it's new or if he re injured his previous injury  He also has not had a bowel movement in the past 5 days    none    Problem list and histories reviewed & adjusted, as indicated.  Additional history: as documented         Reviewed and updated as needed this visit by clinical staff  Tobacco  Allergies  Meds  Problems  Med Hx  Surg Hx  Fam Hx  Soc Hx        Reviewed and updated as needed this visit by Provider          lot of weight to lift up with the other person    Twisted as he lifted her    Years ago had back injury    No bm for 5 days    Usually has one daily    Took a lot of pain pills    Ibuprofen, tylenol, etc    Patient unsure if any of them were narcotics    No abd pain    Still eating some, but less than usual    Gets full earlier     Right upper lip quivers sometimes, for a couple weeks; just periodically        Physical Exam   Constitutional: He is oriented to person, place, and time and well-developed, well-nourished, and in no distress. No distress.   HENT:   Head: Normocephalic and atraumatic.   Eyes: Conjunctivae are normal.   Neck: Carotid bruit is not present.   Cardiovascular: Normal rate, regular rhythm, normal heart sounds and intact distal pulses.  Exam reveals no gallop and no friction rub.    No murmur heard.  Pulmonary/Chest: Effort normal and breath sounds normal. No respiratory distress. He has no wheezes. He has no rales.   Abdominal: Soft. Bowel sounds are normal. He exhibits no distension and no mass. There is no tenderness. There is no rebound and no guarding.   Musculoskeletal: He exhibits no edema.   Neurological: He is alert and oriented to person, place, and time.   Skin: He is not diaphoretic.   Psychiatric: Mood and affect normal.     Patient points to  right lumbar area when asked where pain is  No point tenderness here  No tenderness anywhere over spine, back, neck, ribs, pelvis  Sensation and strength are normal in both lower extremities.  Negative straight leg raising test bilaterally.  Able get up on heels and toes normally.  No pain on axial loading.     Range of motion of back fairly good            ASSESSMENT / PLAN:  (S39.012A) Strain of lumbar region, initial encounter  (primary encounter diagnosis)  Comment: soft tissue/ muscular strain  Plan: tiZANidine (ZANAFLEX) 2 MG tablet        Use this sparingly, warned of side effects.  Would be most helpful at night.  Discussed over the counter meds also.  Try top work on stretching/ range of motion exercises    (K59.00) Constipation, unspecified constipation type  Comment: can use this prn sparingly.  Patient is not on any narcotics and advised against these.  Plan: polyethylene glycol (MIRALAX) powder             (M79.10) Muscle pain  Comment: as above   Plan: tiZANidine (ZANAFLEX) 2 MG tablet             Follow up as needed based on symptoms       I reviewed the patient's medications, allergies, medical history, family history, and social history.    Mitch Herrera MD

## 2018-10-02 NOTE — MR AVS SNAPSHOT
"              After Visit Summary   10/2/2018    Mendoza Shaikh    MRN: [id number]           Patient Information     Date Of Birth          1940        Visit Information        Provider Department      10/2/2018 10:40 AM Mitch Herrera MD [facility]        Today's Diagnoses     Constipation, unspecified constipation type    -  1    Muscle pain        Strain of lumbar region, initial encounter          Care Instructions    Advise smoking cessation    Increase walking/ stretching/ range of motion exercises as you are able    Muscle relaxer tizanidine up to 3x per day.  May be sedating ( so helpful at night )    Over the counter ibuprofen or tylenol as needed    Can use the miralax as needed for constipation    Follow up as needed based on symptoms           Follow-ups after your visit        Who to contact     If you have questions or need follow up information about today's clinic visit or your schedule please contact [facility] directly at [phone].  Normal or non-critical lab and imaging results will be communicated to you by MyChart, letter or phone within 4 business days after the clinic has received the results. If you do not hear from us within 7 days, please contact the clinic through [facility] [employer] or phone. If you have a critical or abnormal lab result, we will notify you by phone as soon as possible.  Submit refill requests through [facility] or call your pharmacy and they will forward the refill request to us. Please allow 3 business days for your refill to be completed.          Additional Information About Your Visit        MyChart Information     [facility] lets you send messages to your doctor, view your test results, renew your prescriptions, schedule appointments and more. To sign up, go to [email or web address] . Click on \"Log in\" on the left side of the screen, which will take you to the Welcome page. Then click on \"Sign up Now\" on the right " side of the page.     You will be asked to enter the access code listed below, as well as some personal information. Please follow the directions to create your username and password.     Your access code is: [email or web address]  Expires: 2018 10:39 AM     Your access code will  in 90 days. If you need help or a new code, please call your [place] clinic or [phone].        Care EveryWhere ID     This is your Care EveryWhere ID. This could be used by other organizations to access your [place] medical records  [id number]        Your Vitals Were     Pulse Temperature Pulse Oximetry BMI (Body Mass Index)          70 96.8  F (36  C) (Oral) 98% 24.52 kg/m2         Blood Pressure from Last 3 Encounters:   10/02/18 147/72   18 139/77   10/31/17 145/70    Weight from Last 3 Encounters:   10/02/18 154 lb 6 oz (70 kg)   18 159 lb (72.1 kg)   18 161 lb (73 kg)              Today, you had the following     No orders found for display         Today's Medication Changes          These changes are accurate as of 10/2/18 11:20 AM.  If you have any questions, ask your nurse or doctor.               Start taking these medicines.        Dose/Directions    polyethylene glycol powder   Commonly known as:  MIRALAX   Used for:  Constipation, unspecified constipation type   Started by:  Mitch Herrera MD        Dose:  1 capful   Take 17 g (1 capful) by mouth 2 times daily as needed for constipation   Quantity:  225 g   Refills:  1       tiZANidine 2 MG tablet   Commonly known as:  ZANAFLEX   Used for:  Muscle pain, Strain of lumbar region, initial encounter   Started by:  Mitch Herrera MD        Dose:  2 mg   Take 1 tablet (2 mg) by mouth 3 times daily as needed for muscle spasms   Quantity:  20 tablet   Refills:  1            Where to get your medicines      These medications were sent to [place] [address] [place] [facility] - [place] [address]  [address] [place] [address]     Phone:  [phone]      tiZANidine 2 MG tablet         Some of these will need a paper prescription and others can be bought over the counter.  Ask your nurse if you have questions.     Bring a paper prescription for each of these medications     polyethylene glycol powder                Primary Care Provider Office Phone # Fax #    Mitch Herrera [phone]       [address] [facility] [address]        Equal Access to Services     [facility] : Hadii aad ku hadasho Soomaali, waaxda luqadaha, qaybta kaalmada adeegyada, waxay idiin hayaan adeeg khroseannash la'ladin . So [facility] [phone].    ATENCIÓN: Si habla español, tiene a gonsales disposición servicios gratuitos de asistencia lingüística. Llame al [phone].    We comply with applicable federal civil rights laws and [place] laws. We do not discriminate on the basis of race, color, national origin, age, disability, sex, sexual orientation, or gender identity.            Thank you!     Thank you for choosing [facility]  for your care. Our goal is always to provide you with excellent care. Hearing back from our patients is one way we can continue to improve our services. Please take a few minutes to complete the written survey that you may receive in the mail after your visit with us. Thank you!             Your Updated Medication List - Protect others around you: Learn how to safely use, store and throw away your medicines at www.disposemymeds.org.          This list is accurate as of 10/2/18 11:20 AM.  Always use your most recent med list.                   Brand Name Dispense Instructions for use Diagnosis    amLODIPine 10 MG tablet    NORVASC    90 tablet    TAKE 1 TABLET (10 MG) BY MOUTH DAILY    HTN (hypertension)       aspirin 81 MG tablet      Take 1 tablet by mouth daily.        * atenolol 25 MG tablet    TENORMIN    270 tablet    TAKE THREE TABLETS BY MOUTH ONCE DAILY    Hypertension goal BP (blood pressure) < 140/90       *  atenolol 50 MG tablet    TENORMIN    135 tablet    TAKE 1 AND 1/2 TABLETS BY MOUTH ONCE DAILY    Hypertension goal BP (blood pressure) < 140/90       clopidogrel 75 MG tablet    PLAVIX     Take 75 mg by mouth daily        hydrochlorothiazide 25 MG tablet    HYDRODIURIL    90 tablet    TAKE 1 TABLET (25 MG) BY MOUTH DAILY    Hypertension goal BP (blood pressure) < 140/90       polyethylene glycol powder    MIRALAX    225 g    Take 17 g (1 capful) by mouth 2 times daily as needed for constipation    Constipation, unspecified constipation type       potassium chloride SA 20 MEQ CR tablet    K-DUR/KLOR-CON M    90 tablet    Take 1 tablet (20 mEq) by mouth daily    Hypokalemia       predniSONE 2.5 MG tablet    DELTASONE    180 tablet    TAKE 2 TABLETS BY MOUTH EVERY DAY    Leukocytoclastic vasculitis (H)       * simvastatin 40 MG tablet    ZOCOR    90 tablet    TAKE 1 TABLET (40 MG) BY MOUTH AT BEDTIME    Hyperlipidemia LDL goal <100       * simvastatin 40 MG tablet    ZOCOR    90 tablet    TAKE 1 TABLET (40 MG) BY MOUTH AT BEDTIME    Hyperlipidemia LDL goal <100       tiZANidine 2 MG tablet    ZANAFLEX    20 tablet    Take 1 tablet (2 mg) by mouth 3 times daily as needed for muscle spasms    Muscle pain, Strain of lumbar region, initial encounter       varenicline 1 MG tablet    CHANTIX    60 tablet    Take 1 tablet (1 mg) by mouth 2 times daily    Tobacco abuse       vitamin D 1000 units capsule      Take 2 capsules by mouth daily.        * Notice:  This list has 4 medication(s) that are the same as other medications prescribed for you. Read the directions carefully, and ask your doctor or other care provider to review them with you.

## 2018-10-02 NOTE — PATIENT INSTRUCTIONS
Advise smoking cessation    Increase walking/ stretching/ range of motion exercises as you are able    Muscle relaxer tizanidine up to 3x per day.  May be sedating ( so helpful at night )    Over the counter ibuprofen or tylenol as needed    Can use the miralax as needed for constipation    Follow up as needed based on symptoms

## 2018-10-05 ENCOUNTER — TELEPHONE (OUTPATIENT)
Dept: FAMILY MEDICINE | Facility: CLINIC | Age: 78
End: 2018-10-05

## 2018-10-05 DIAGNOSIS — K59.00 CONSTIPATION, UNSPECIFIED CONSTIPATION TYPE: Primary | ICD-10-CM

## 2018-10-05 RX ORDER — BISACODYL 10 MG
10 SUPPOSITORY, RECTAL RECTAL 2 TIMES DAILY PRN
Qty: 25 SUPPOSITORY | Refills: 1 | Status: SHIPPED | OUTPATIENT
Start: 2018-10-05

## 2018-10-05 NOTE — TELEPHONE ENCOUNTER
I called and discussed in detail.  No vomiting, no abd pain but no bm.  Start prn suppository.  Continue oral constipation meds.  If not better use over the counter enema.  If not better after that, then to emergency room.  To emergency room sooner if needed based on symptoms.  Patient agreed.  Mitch Herrera MD    
Reason for call:  Patient reporting a symptom    Symptom or request: No bowel movement    Duration (how long have symptoms been present): 7 days     Have you been treated for this before? n/a    Additional comments: Patient is requesting for pcp nurse to call regarding symptom, please advise.     Phone Number patient can be reached at:  Home number on file [phone] (home)    Best Time:  Anytime    Can we leave a detailed message on this number:  YES    Call taken on 10/5/2018 at 1:11 PM by Ольга Ruggiero  
Routing to PCP to review and advise.  Constipation for 7 days?    Please discontinue Atenolol prescription from May as discussed.   Staci Joel RN  [facility]        
Nader Pacheco)

## 2018-10-13 ENCOUNTER — TRANSFERRED RECORDS (OUTPATIENT)
Dept: HEALTH INFORMATION MANAGEMENT | Facility: CLINIC | Age: 78
End: 2018-10-13

## 2018-10-16 ENCOUNTER — TELEPHONE (OUTPATIENT)
Dept: FAMILY MEDICINE | Facility: CLINIC | Age: 78
End: 2018-10-16

## 2018-10-16 DIAGNOSIS — M54.9 BACK PAIN, UNSPECIFIED BACK LOCATION, UNSPECIFIED BACK PAIN LATERALITY, UNSPECIFIED CHRONICITY: Primary | ICD-10-CM

## 2018-10-16 RX ORDER — TRAMADOL HYDROCHLORIDE 50 MG/1
50 TABLET ORAL EVERY 6 HOURS PRN
Qty: 24 TABLET | Refills: 0 | Status: SHIPPED | OUTPATIENT
Start: 2018-10-16

## 2018-10-16 NOTE — TELEPHONE ENCOUNTER
Prescription for Tramadol was faxed to [facility], unable to get in touch with pt. Phone number was busy. Tried multiple times  Mell Lazar TC

## 2018-10-16 NOTE — TELEPHONE ENCOUNTER
Reason for Call:  Other prescription    Detailed comments:  Patient re injured back said he saw you about this already requesting stronger pain pill.  Tylenol and other over the counter pain medications not working.    Phone Number Patient can be reached at: Home number on file [phone] (home)    Best Time:  Anytime     Can we leave a detailed message on this number? YES    Call taken on 10/16/2018 at 10:06 AM by Sahara Tripp

## 2018-10-16 NOTE — TELEPHONE ENCOUNTER
Patient was in the ER 10/13/18 for back pain, constipation. RN tried calling patient to schedule an appointment to be evaluated by provider as patient is requesting a stronger pain pill and an ER followup.  Patient states he was just in the ER, had an xray done and was found to have a re-injury of his back in which he is in constant pain over. He is requesting a stronger pain medications as what he is currently doing-Tylenol and other OTC are not working.     Routed to provider to advise.    Delores Blanco RN  [facility]

## 2018-10-16 NOTE — TELEPHONE ENCOUNTER
Can try tramadol prn.  See prescription.    See us in clinic early next week for follow up.    Please inform patient.    Mitch Herrera MD

## 2018-10-17 ENCOUNTER — TELEPHONE (OUTPATIENT)
Dept: FAMILY MEDICINE | Facility: CLINIC | Age: 78
End: 2018-10-17

## 2018-10-17 NOTE — TELEPHONE ENCOUNTER
Called patient at [phone] (home) in regards to message. RN scheduled patient to see a covering provider tomorrow for an ER Follow-up and discuss meds.    Delores Blanco RN  [facility]

## 2018-10-17 NOTE — TELEPHONE ENCOUNTER
Reason for Call:  Other call back and prescription    Detailed comments:  Patient saying the traMADol (ULTRAM) 50 MG tablet is not strong enough said it's doing noting.  Wants somethings stronger please give him a call back.    Phone Number Patient can be reached at: Home number on file [phone] (home)    Best Time:  Anytime     Can we leave a detailed message on this number? YES    Call taken on 10/17/2018 at 10:53 AM by Sahara Tripp

## 2018-10-20 ENCOUNTER — TRANSFERRED RECORDS (OUTPATIENT)
Dept: HEALTH INFORMATION MANAGEMENT | Facility: CLINIC | Age: 78
End: 2018-10-20

## 2018-10-23 ENCOUNTER — OFFICE VISIT (OUTPATIENT)
Dept: FAMILY MEDICINE | Facility: CLINIC | Age: 78
End: 2018-10-23
Payer: MEDICARE

## 2018-10-23 VITALS
BODY MASS INDEX: 22.55 KG/M2 | WEIGHT: 142 LBS | SYSTOLIC BLOOD PRESSURE: 146 MMHG | DIASTOLIC BLOOD PRESSURE: 71 MMHG | HEART RATE: 90 BPM | OXYGEN SATURATION: 97 % | TEMPERATURE: 98 F

## 2018-10-23 DIAGNOSIS — M54.50 ACUTE RIGHT-SIDED LOW BACK PAIN WITHOUT SCIATICA: Primary | ICD-10-CM

## 2018-10-23 PROCEDURE — 99213 OFFICE O/P EST LOW 20 MIN: CPT | Performed by: FAMILY MEDICINE

## 2018-10-23 RX ORDER — MELOXICAM 15 MG/1
15 TABLET ORAL DAILY
Qty: 30 TABLET | Refills: 1 | Status: SHIPPED | OUTPATIENT
Start: 2018-10-23

## 2018-10-23 ASSESSMENT — PAIN SCALES - GENERAL: PAINLEVEL: WORST PAIN (10)

## 2018-10-23 NOTE — MR AVS SNAPSHOT
"              After Visit Summary   10/23/2018    Mendoza Shaikh    MRN: [id number]           Patient Information     Date Of Birth          1940        Visit Information        Provider Department      10/23/2018 2:20 PM Julio Cesar Jaramillo MD [facility]        Today's Diagnoses     Acute left-sided low back pain without sciatica    -  1       Follow-ups after your visit        Follow-up notes from your care team     Return in about 7 days (around 10/30/2018).      Who to contact     If you have questions or need follow up information about today's clinic visit or your schedule please contact [facility] directly at [phone].  Normal or non-critical lab and imaging results will be communicated to you by MyChart, letter or phone within 4 business days after the clinic has received the results. If you do not hear from us within 7 days, please contact the clinic through MyChart or phone. If you have a critical or abnormal lab result, we will notify you by phone as soon as possible.  Submit refill requests through [employer] or call your pharmacy and they will forward the refill request to us. Please allow 3 business days for your refill to be completed.          Additional Information About Your Visit        MyChart Information     [employer] lets you send messages to your doctor, view your test results, renew your prescriptions, schedule appointments and more. To sign up, go to [email or web address] . Click on \"Log in\" on the left side of the screen, which will take you to the Welcome page. Then click on \"Sign up Now\" on the right side of the page.     You will be asked to enter the access code listed below, as well as some personal information. Please follow the directions to create your username and password.     Your access code is: [email or web address]  Expires: 2018 10:39 AM     Your access code will  in 90 days. If you need help or a new code, please " call your [place] clinic or [phone].        Care EveryWhere ID     This is your Care EveryWhere ID. This could be used by other organizations to access your [place] medical records  [id number]        Your Vitals Were     Pulse Temperature Pulse Oximetry BMI (Body Mass Index)          90 98  F (36.7  C) (Oral) 97% 22.55 kg/m2         Blood Pressure from Last 3 Encounters:   10/23/18 146/71   10/02/18 147/72   05/02/18 139/77    Weight from Last 3 Encounters:   10/23/18 142 lb (64.4 kg)   10/02/18 154 lb 6 oz (70 kg)   05/02/18 159 lb (72.1 kg)              Today, you had the following     No orders found for display         Today's Medication Changes          These changes are accurate as of 10/23/18  3:32 PM.  If you have any questions, ask your nurse or doctor.               Start taking these medicines.        Dose/Directions    meloxicam 15 MG tablet   Commonly known as:  MOBIC   Used for:  Acute left-sided low back pain without sciatica   Started by:  Julio Cesar Jaramillo MD        Dose:  15 mg   Take 1 tablet (15 mg) by mouth daily   Quantity:  30 tablet   Refills:  1            Where to get your medicines      These medications were sent to [place] [id number] [place] [facility] [address]  [address] [facility] [place] [address]     Phone:  [phone]     meloxicam 15 MG tablet                Primary Care Provider Office Phone # Fax #    Mitch Herrera [phone]       [address]  [place]. \Hospital Has a New Name and Outlook.\"" [id number]        Equal Access to Services     [facility]: Hadii shaheen velazco hadasho Soomaali, waaxda luqadaha, qaybta kaalmada sigifredo farias . So [facility] [phone].    ATENCIÓN: Si habla español, tiene a gonsales disposición servicios gratuitos de asistencia lingüística. Llame al [phone].    We comply with applicable federal civil rights laws and [place] laws. We do not discriminate on the basis of race, color, national origin, age, disability,  sex, sexual orientation, or gender identity.            Thank you!     Thank you for choosing [facility]  for your care. Our goal is always to provide you with excellent care. Hearing back from our patients is one way we can continue to improve our services. Please take a few minutes to complete the written survey that you may receive in the mail after your visit with us. Thank you!             Your Updated Medication List - Protect others around you: Learn how to safely use, store and throw away your medicines at www.disposemymeds.org.          This list is accurate as of 10/23/18  3:32 PM.  Always use your most recent med list.                   Brand Name Dispense Instructions for use Diagnosis    amLODIPine 10 MG tablet    NORVASC    90 tablet    TAKE 1 TABLET (10 MG) BY MOUTH DAILY    HTN (hypertension)       aspirin 81 MG tablet      Take 1 tablet by mouth daily.        atenolol 50 MG tablet    TENORMIN    135 tablet    TAKE 1 AND 1/2 TABLETS BY MOUTH ONCE DAILY    Hypertension goal BP (blood pressure) < 140/90       bisacodyl 10 MG Suppository    DULCOLAX    25 suppository    Place 1 suppository (10 mg) rectally 2 times daily as needed for constipation    Constipation, unspecified constipation type       clopidogrel 75 MG tablet    PLAVIX     Take 75 mg by mouth daily        hydrochlorothiazide 25 MG tablet    HYDRODIURIL    90 tablet    TAKE 1 TABLET (25 MG) BY MOUTH DAILY    Hypertension goal BP (blood pressure) < 140/90       meloxicam 15 MG tablet    MOBIC    30 tablet    Take 1 tablet (15 mg) by mouth daily    Acute left-sided low back pain without sciatica       polyethylene glycol powder    MIRALAX    225 g    Take 17 g (1 capful) by mouth 2 times daily as needed for constipation    Constipation, unspecified constipation type       potassium chloride SA 20 MEQ CR tablet    K-DUR/KLOR-CON M    90 tablet    Take 1 tablet (20 mEq) by mouth daily    Hypokalemia       predniSONE 2.5  MG tablet    DELTASONE    180 tablet    TAKE 2 TABLETS BY MOUTH EVERY DAY    Leukocytoclastic vasculitis (H)       * simvastatin 40 MG tablet    ZOCOR    90 tablet    TAKE 1 TABLET (40 MG) BY MOUTH AT BEDTIME    Hyperlipidemia LDL goal <100       * simvastatin 40 MG tablet    ZOCOR    90 tablet    TAKE 1 TABLET (40 MG) BY MOUTH AT BEDTIME    Hyperlipidemia LDL goal <100       tiZANidine 2 MG tablet    ZANAFLEX    20 tablet    Take 1 tablet (2 mg) by mouth 3 times daily as needed for muscle spasms    Muscle pain, Strain of lumbar region, initial encounter       traMADol 50 MG tablet    ULTRAM    24 tablet    Take 1 tablet (50 mg) by mouth every 6 hours as needed for severe pain    Back pain, unspecified back location, unspecified back pain laterality, unspecified chronicity       varenicline 1 MG tablet    CHANTIX    60 tablet    Take 1 tablet (1 mg) by mouth 2 times daily    Tobacco abuse       vitamin D 1000 units capsule      Take 2 capsules by mouth daily.        * Notice:  This list has 2 medication(s) that are the same as other medications prescribed for you. Read the directions carefully, and ask your doctor or other care provider to review them with you.

## 2018-10-23 NOTE — PROGRESS NOTES
SUBJECTIVE:   Mendoza Shaikh is a 78 year old male who presents to clinic today for the following health issues:    ED/UC Followup:    Facility:  [place]  Date of visit: 10/20/18  Reason for visit: Low right sided back pain  Current Status: Not any better       Has had chronic pain in the low back   He went to  his girlfriend who fell   This occurred 3 weeks ago     Pain is localized to the back and did not go down the leg       Patient was given oxycodone from the emergency room.  He also states he was given a muscle relaxer and did not think either 1 of those helped at all.  He states the pain is severe and he did not think Tylenol would be enough for this even though that was recommendation of the emergency room.  In reviewing the emergency room note, the patient had several probably old but age undetermined compression fractures of the lumbar vertebrae.    O:/71 (BP Location: Right arm, Patient Position: Sitting, Cuff Size: Adult Regular)  Pulse 90  Temp 98  F (36.7  C) (Oral)  Wt 142 lb (64.4 kg)  SpO2 97%  BMI 22.55 kg/m2      Chest wall normal to inspection and palpation. Good excursion bilaterally. Lungs clear to auscultation. Good air movement bilaterally without rales, wheezes, or rhonchi.   Regular rate and  rhythm. S1 and S2 normal, no murmurs, clicks, gallops or rubs. No edema or JVD.    The abdomen is soft without tenderness, guarding, mass, rebound or organomegaly. Bowel sounds are normal. No CVA tenderness or inguinal adenopathy noted.    Patient has no localized tenderness present over the spine he gets up slowly but has normal range of motion he does tend hunch over when he stands up and walks  Tenderness present over the right paraspinal muscle  Straight leg raises are normal bilaterally      \    ICD-10-CM    1. Acute right-sided low back pain without sciatica M54.5      Patient was started on meloxicam 15 mg 1 daily  Recheck in 1 week as needed  I would probably avoid  narcotics in this patient  He has been having problems with constipation in the past with his medications and ER treated him for constipation.

## 2018-10-25 ENCOUNTER — TRANSFERRED RECORDS (OUTPATIENT)
Dept: HEALTH INFORMATION MANAGEMENT | Facility: CLINIC | Age: 78
End: 2018-10-25

## 2018-10-29 ENCOUNTER — TRANSFERRED RECORDS (OUTPATIENT)
Dept: HEALTH INFORMATION MANAGEMENT | Facility: CLINIC | Age: 78
End: 2018-10-29

## 2018-11-14 ENCOUNTER — TRANSFERRED RECORDS (OUTPATIENT)
Dept: HEALTH INFORMATION MANAGEMENT | Facility: CLINIC | Age: 78
End: 2018-11-14

## 2018-11-20 ENCOUNTER — TRANSFERRED RECORDS (OUTPATIENT)
Dept: HEALTH INFORMATION MANAGEMENT | Facility: CLINIC | Age: 78
End: 2018-11-20

## 2019-06-26 NOTE — MR AVS SNAPSHOT
"              After Visit Summary   4/14/2017    Mendoza Shaikh    MRN: [id number]           Patient Information     Date Of Birth          1940        Visit Information        Provider Department      4/14/2017 2:20 PM Mitch Herrera MD [facility]        Today's Diagnoses     Leukocytoclastic vasculitis (H)          Care Instructions    Continue 3 pills for another week, then if rash improving go to 2 pills daily    Return to clinic if you need to stay on the higher dose     Back strain was likely muscular; stay as active as possible     Try to vary diet more    Smoking cessation                 Follow-ups after your visit        Who to contact     If you have questions or need follow up information about today's clinic visit or your schedule please contact [facility] directly at [phone].  Normal or non-critical lab and imaging results will be communicated to you by MyChart, letter or phone within 4 business days after the clinic has received the results. If you do not hear from us within 7 days, please contact the clinic through MyChart or phone. If you have a critical or abnormal lab result, we will notify you by phone as soon as possible.  Submit refill requests through [employer] or call your pharmacy and they will forward the refill request to us. Please allow 3 business days for your refill to be completed.          Additional Information About Your Visit        MyChart Information     [employer] lets you send messages to your doctor, view your test results, renew your prescriptions, schedule appointments and more. To sign up, go to [email or web address] . Click on \"Log in\" on the left side of the screen, which will take you to the Welcome page. Then click on \"Sign up Now\" on the right side of the page.     You will be asked to enter the access code listed below, as well as some personal information. Please follow the directions to create your " username and password.     Your access code is: [email or web address]  Expires: 2017  3:01 PM     Your access code will  in 90 days. If you need help or a new code, please call your [facility] or [phone].        Care EveryWhere ID     This is your Care EveryWhere ID. This could be used by other organizations to access your [place] medical records  [id number]        Your Vitals Were     Pulse Temperature Pulse Oximetry BMI (Body Mass Index)          64 97.1  F (36.2  C) (Oral) 99% 27.25 kg/m2         Blood Pressure from Last 3 Encounters:   17 155/78   16 168/74   16 136/70    Weight from Last 3 Encounters:   17 174 lb (78.9 kg)   17 172 lb (78 kg)   02/10/17 172 lb (78 kg)              Today, you had the following     No orders found for display         Where to get your medicines      These medications were sent to [place] [address] [place] [facility] [place] - [address]  [address] [place] [address]     Phone:  [phone]     predniSONE 2.5 MG tablet          Primary Care Provider Office Phone # Fax #    Mitch Herrera [phone]       [facility] [address]  [place] [address]        Thank you!     Thank you for choosing [facility]  for your care. Our goal is always to provide you with excellent care. Hearing back from our patients is one way we can continue to improve our services. Please take a few minutes to complete the written survey that you may receive in the mail after your visit with us. Thank you!             Your Updated Medication List - Protect others around you: Learn how to safely use, store and throw away your medicines at www.disposemymeds.org.          This list is accurate as of: 17  3:01 PM.  Always use your most recent med list.                   Brand Name Dispense Instructions for use    amLODIPine 10 MG tablet    NORVASC    90 tablet    Take 1 tablet (10 mg) by mouth  daily       aspirin 81 MG tablet      Take 1 tablet by mouth daily.       atenolol 25 MG tablet    TENORMIN    270 tablet    3 po daily       hydrochlorothiazide 25 MG tablet    HYDRODIURIL    90 tablet    Take 1 tablet (25 mg) by mouth daily       potassium chloride SA 20 MEQ CR tablet    potassium chloride    90 tablet    Take 1 tablet (20 mEq) by mouth daily       predniSONE 2.5 MG tablet    DELTASONE    180 tablet    Take 2 tablets (5 mg) by mouth daily       simvastatin 40 MG tablet    ZOCOR    90 tablet    Take 1 tablet (40 mg) by mouth At Bedtime       vitamin D 1000 UNITS capsule      Take 2 capsules by mouth daily.          surgical precautions/fall precautions/left hip precautions

## 2019-09-19 ENCOUNTER — TELEPHONE (OUTPATIENT)
Dept: FAMILY MEDICINE | Facility: CLINIC | Age: 79
End: 2019-09-19

## 2019-09-19 NOTE — TELEPHONE ENCOUNTER
Panel Management Review      Patient has the following on his problem list:     Hypertension   Last three blood pressure readings:  BP Readings from Last 3 Encounters:   10/23/18 146/71   10/02/18 147/72   05/02/18 139/77     Blood pressure: FAILED    HTN Guidelines:  Less than 140/90      Composite cancer screening  Chart review shows that this patient is due/due soon for the following None  Summary:    Patient is due/failing the following:   BP CHECK    Action needed:   Patient needs office visit for blood pressure check up.    Type of outreach:    Sent letter. 09/19/2019    Questions for provider review:    None                                                                                                                                    Sahra Ayala [facility]       Chart routed to Care Team .

## 2019-09-19 NOTE — LETTER
September 19, 2019    Mendoza Shaikh  [address] [id number]    Dear Mendoza    We care about your health and have reviewed your health plan. We have reviewed your medical conditions, medication list, and lab results and are making recommendations based on this review, to better manage your health.    You are in particular need of attention regarding:  - Your High Blood Pressure, Please call to schedule an appointment with your provider or nurse      Here is a list of Health Maintenance topics that are due now or due soon:  Health Maintenance Due   Topic Date Due     ZOSTER IMMUNIZATION (1 of 2) 02/13/1991     MEDICARE ANNUAL WELLNESS VISIT  02/13/2006     EYE EXAM  09/13/2013     LIPID  10/31/2018     PHQ-2  01/01/2019     INFLUENZA VACCINE (1) 09/01/2019     FALL RISK ASSESSMENT  10/02/2019     BMP  10/13/2019       Please call us at [phone] (or use [employer]) to address the above recommendations. If we do not hear from you in the next couple of weeks we will be reaching out to you again.    Thank you for trusting [facility] and we appreciate the opportunity to serve you.  We look forward to supporting your healthcare needs in the future.    Healthy Regards,    Your care team

## 2019-10-02 NOTE — TELEPHONE ENCOUNTER
Panel Management Review      Patient has the following on his problem list:       Composite cancer screening  Chart review shows that this patient is due/due soon for the following   Summary:    Patient is due/failing the following:       Action needed:       Type of outreach:    patient passed away last November. I gave to Myesha to note chart     Questions for provider review:    None                                                                                                                                    Sahra Ayala [facility]       Chart routed to chart closed .

## 2024-06-17 NOTE — TELEPHONE ENCOUNTER
LVM with patient's wife to schedule sooner appt   Patient called back.  Estee Seo RN [facility] Triage.     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 Mireles. Patient scheduled with Dr. Mireles 7/8. Please advise if patient should be seen sooner. Thank you.    Abdomen soft, non-tender, no guarding.